# Patient Record
Sex: FEMALE | Race: WHITE | NOT HISPANIC OR LATINO
[De-identification: names, ages, dates, MRNs, and addresses within clinical notes are randomized per-mention and may not be internally consistent; named-entity substitution may affect disease eponyms.]

---

## 2018-02-21 ENCOUNTER — APPOINTMENT (OUTPATIENT)
Dept: DERMATOLOGY | Facility: CLINIC | Age: 53
End: 2018-02-21
Payer: MEDICARE

## 2018-02-21 VITALS — WEIGHT: 225 LBS | SYSTOLIC BLOOD PRESSURE: 148 MMHG | DIASTOLIC BLOOD PRESSURE: 92 MMHG | BODY MASS INDEX: 39.86 KG/M2

## 2018-02-21 DIAGNOSIS — L90.5 SCAR CONDITIONS AND FIBROSIS OF SKIN: ICD-10-CM

## 2018-02-21 PROCEDURE — 99214 OFFICE O/P EST MOD 30 MIN: CPT

## 2018-07-11 ENCOUNTER — APPOINTMENT (OUTPATIENT)
Dept: DERMATOLOGY | Facility: CLINIC | Age: 53
End: 2018-07-11
Payer: MEDICARE

## 2018-07-11 VITALS — DIASTOLIC BLOOD PRESSURE: 86 MMHG | SYSTOLIC BLOOD PRESSURE: 142 MMHG

## 2018-07-11 DIAGNOSIS — L72.0 EPIDERMAL CYST: ICD-10-CM

## 2018-07-11 PROCEDURE — 99214 OFFICE O/P EST MOD 30 MIN: CPT

## 2018-08-22 ENCOUNTER — APPOINTMENT (OUTPATIENT)
Dept: DERMATOLOGY | Facility: CLINIC | Age: 53
End: 2018-08-22

## 2018-09-14 ENCOUNTER — APPOINTMENT (OUTPATIENT)
Dept: DERMATOLOGY | Facility: CLINIC | Age: 53
End: 2018-09-14
Payer: MEDICARE
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
## 2018-09-14 PROCEDURE — 99213 OFFICE O/P EST LOW 20 MIN: CPT

## 2019-01-31 ENCOUNTER — APPOINTMENT (OUTPATIENT)
Dept: DERMATOLOGY | Facility: CLINIC | Age: 54
End: 2019-01-31

## 2019-02-08 ENCOUNTER — APPOINTMENT (OUTPATIENT)
Dept: DERMATOLOGY | Facility: CLINIC | Age: 54
End: 2019-02-08
Payer: MEDICARE

## 2019-02-08 VITALS — SYSTOLIC BLOOD PRESSURE: 130 MMHG | DIASTOLIC BLOOD PRESSURE: 80 MMHG

## 2019-02-08 PROCEDURE — 99214 OFFICE O/P EST MOD 30 MIN: CPT

## 2019-04-18 ENCOUNTER — LABORATORY RESULT (OUTPATIENT)
Age: 54
End: 2019-04-18

## 2019-04-18 ENCOUNTER — APPOINTMENT (OUTPATIENT)
Age: 54
End: 2019-04-18
Payer: MEDICARE

## 2019-04-18 DIAGNOSIS — D48.5 NEOPLASM OF UNCERTAIN BEHAVIOR OF SKIN: ICD-10-CM

## 2019-04-18 PROCEDURE — 99214 OFFICE O/P EST MOD 30 MIN: CPT | Mod: 25,GC

## 2019-04-18 PROCEDURE — 11104 PUNCH BX SKIN SINGLE LESION: CPT | Mod: GC

## 2019-04-18 NOTE — PHYSICAL EXAM
[Alert] : alert [Oriented x 3] : ~L oriented x 3 [Well Nourished] : well nourished [Conjunctiva Non-injected] : conjunctiva non-injected [No Visual Lymphadenopathy] : no visual  lymphadenopathy [No Clubbing] : no clubbing [No Bromhidrosis] : no bromhidrosis [No Edema] : no edema [No Chromhidrosis] : no chromhidrosis [FreeTextEntry3] : 0.7cm pink papule on L upper chest\par \par erythematous patch w/ yellow scale on R medial eyebrow

## 2019-04-18 NOTE — HISTORY OF PRESENT ILLNESS
[FreeTextEntry1] : rash on R eyebrow [de-identified] : 53 year old F w/ PMH of multiple myeloma here for evaluation of:\par \par Rash on R eyebrow x 1 mo. Asymptomatic. Has not tried any tx's.\par \par Pink asymptomatic bump on L upper chest x 3 weeks. No similar lesions elsewhere. \par \par Pt has a history of basal cell carcinoma x 3 on the face (nose,  left cutaneous lip, and chin) - pt reports that they occurred and were treated around 2009. Pt states that she will RTC for her yearly TBSE. \par \par \par \par

## 2019-04-25 ENCOUNTER — TRANSCRIPTION ENCOUNTER (OUTPATIENT)
Age: 54
End: 2019-04-25

## 2019-05-02 ENCOUNTER — APPOINTMENT (OUTPATIENT)
Dept: DERMATOLOGY | Facility: CLINIC | Age: 54
End: 2019-05-02
Payer: MEDICARE

## 2019-05-02 DIAGNOSIS — L72.3 SEBACEOUS CYST: ICD-10-CM

## 2019-05-02 DIAGNOSIS — L08.9 SEBACEOUS CYST: ICD-10-CM

## 2019-05-02 PROCEDURE — 11900 INJECT SKIN LESIONS </W 7: CPT

## 2019-05-02 PROCEDURE — 99213 OFFICE O/P EST LOW 20 MIN: CPT | Mod: 25

## 2020-01-15 ENCOUNTER — APPOINTMENT (OUTPATIENT)
Dept: DERMATOLOGY | Facility: CLINIC | Age: 55
End: 2020-01-15
Payer: MEDICARE

## 2020-01-15 DIAGNOSIS — Z13.89 ENCOUNTER FOR SCREENING FOR OTHER DISORDER: ICD-10-CM

## 2020-01-15 DIAGNOSIS — D17.9 BENIGN LIPOMATOUS NEOPLASM, UNSPECIFIED: ICD-10-CM

## 2020-01-15 DIAGNOSIS — R61 GENERALIZED HYPERHIDROSIS: ICD-10-CM

## 2020-01-15 PROCEDURE — 99213 OFFICE O/P EST LOW 20 MIN: CPT

## 2020-08-13 ENCOUNTER — APPOINTMENT (OUTPATIENT)
Dept: DERMATOLOGY | Facility: CLINIC | Age: 55
End: 2020-08-13

## 2020-09-16 ENCOUNTER — APPOINTMENT (OUTPATIENT)
Dept: DERMATOLOGY | Facility: CLINIC | Age: 55
End: 2020-09-16

## 2020-11-18 ENCOUNTER — APPOINTMENT (OUTPATIENT)
Dept: DERMATOLOGY | Facility: CLINIC | Age: 55
End: 2020-11-18
Payer: MEDICARE

## 2020-11-18 DIAGNOSIS — L70.0 ACNE VULGARIS: ICD-10-CM

## 2020-11-18 PROCEDURE — 99214 OFFICE O/P EST MOD 30 MIN: CPT

## 2020-11-18 RX ORDER — HYDROCORTISONE 25 MG/G
2.5 OINTMENT TOPICAL
Qty: 1 | Refills: 0 | Status: DISCONTINUED | COMMUNITY
Start: 2019-04-18 | End: 2020-11-18

## 2020-11-18 RX ORDER — MUPIROCIN 20 MG/G
2 OINTMENT TOPICAL
Qty: 1 | Refills: 0 | Status: ACTIVE | COMMUNITY
Start: 2020-11-18 | End: 1900-01-01

## 2020-11-18 RX ORDER — KETOCONAZOLE 20 MG/G
2 CREAM TOPICAL
Qty: 1 | Refills: 1 | Status: DISCONTINUED | COMMUNITY
Start: 2019-04-18 | End: 2020-11-18

## 2020-11-18 RX ORDER — HALOBETASOL PROPIONATE 0.5 MG/G
0.05 OINTMENT TOPICAL TWICE DAILY
Qty: 1 | Refills: 2 | Status: DISCONTINUED | COMMUNITY
Start: 2018-09-14 | End: 2020-11-18

## 2020-11-18 RX ORDER — CLINDAMYCIN PHOSPHATE 10 MG/ML
1 LOTION TOPICAL
Qty: 1 | Refills: 5 | Status: ACTIVE | COMMUNITY
Start: 2020-11-18 | End: 1900-01-01

## 2020-11-18 RX ORDER — FUROSEMIDE 20 MG/1
20 TABLET ORAL
Refills: 0 | Status: ACTIVE | COMMUNITY

## 2021-05-25 ENCOUNTER — EMERGENCY (EMERGENCY)
Facility: HOSPITAL | Age: 56
LOS: 1 days | Discharge: HOME | End: 2021-05-25
Attending: STUDENT IN AN ORGANIZED HEALTH CARE EDUCATION/TRAINING PROGRAM | Admitting: STUDENT IN AN ORGANIZED HEALTH CARE EDUCATION/TRAINING PROGRAM
Payer: MEDICARE

## 2021-05-25 VITALS
OXYGEN SATURATION: 98 % | HEART RATE: 94 BPM | DIASTOLIC BLOOD PRESSURE: 82 MMHG | RESPIRATION RATE: 16 BRPM | SYSTOLIC BLOOD PRESSURE: 147 MMHG | TEMPERATURE: 99 F

## 2021-05-25 VITALS
SYSTOLIC BLOOD PRESSURE: 141 MMHG | HEART RATE: 110 BPM | OXYGEN SATURATION: 97 % | RESPIRATION RATE: 16 BRPM | DIASTOLIC BLOOD PRESSURE: 88 MMHG | TEMPERATURE: 99 F

## 2021-05-25 LAB
ALBUMIN SERPL ELPH-MCNC: 3.4 G/DL — SIGNIFICANT CHANGE UP (ref 3.3–5)
ALP SERPL-CCNC: 75 U/L — SIGNIFICANT CHANGE UP (ref 40–120)
ALT FLD-CCNC: 24 U/L — SIGNIFICANT CHANGE UP (ref 12–78)
ANION GAP SERPL CALC-SCNC: 4 MMOL/L — LOW (ref 5–17)
APTT BLD: 26.4 SEC — LOW (ref 27.5–35.5)
AST SERPL-CCNC: 14 U/L — LOW (ref 15–37)
BASOPHILS # BLD AUTO: 0.04 K/UL — SIGNIFICANT CHANGE UP (ref 0–0.2)
BASOPHILS NFR BLD AUTO: 0.4 % — SIGNIFICANT CHANGE UP (ref 0–2)
BILIRUB SERPL-MCNC: 0.5 MG/DL — SIGNIFICANT CHANGE UP (ref 0.2–1.2)
BUN SERPL-MCNC: 11 MG/DL — SIGNIFICANT CHANGE UP (ref 7–23)
CALCIUM SERPL-MCNC: 8.7 MG/DL — SIGNIFICANT CHANGE UP (ref 8.5–10.1)
CHLORIDE SERPL-SCNC: 109 MMOL/L — HIGH (ref 96–108)
CO2 SERPL-SCNC: 29 MMOL/L — SIGNIFICANT CHANGE UP (ref 22–31)
CREAT SERPL-MCNC: 0.77 MG/DL — SIGNIFICANT CHANGE UP (ref 0.5–1.3)
EOSINOPHIL # BLD AUTO: 0.1 K/UL — SIGNIFICANT CHANGE UP (ref 0–0.5)
EOSINOPHIL NFR BLD AUTO: 1 % — SIGNIFICANT CHANGE UP (ref 0–6)
GLUCOSE SERPL-MCNC: 95 MG/DL — SIGNIFICANT CHANGE UP (ref 70–99)
HCT VFR BLD CALC: 41.4 % — SIGNIFICANT CHANGE UP (ref 34.5–45)
HGB BLD-MCNC: 13.9 G/DL — SIGNIFICANT CHANGE UP (ref 11.5–15.5)
IMM GRANULOCYTES NFR BLD AUTO: 0.9 % — SIGNIFICANT CHANGE UP (ref 0–1.5)
INR BLD: 1.02 RATIO — SIGNIFICANT CHANGE UP (ref 0.88–1.16)
LYMPHOCYTES # BLD AUTO: 1.3 K/UL — SIGNIFICANT CHANGE UP (ref 1–3.3)
LYMPHOCYTES # BLD AUTO: 13.1 % — SIGNIFICANT CHANGE UP (ref 13–44)
MCHC RBC-ENTMCNC: 32.1 PG — SIGNIFICANT CHANGE UP (ref 27–34)
MCHC RBC-ENTMCNC: 33.6 GM/DL — SIGNIFICANT CHANGE UP (ref 32–36)
MCV RBC AUTO: 95.6 FL — SIGNIFICANT CHANGE UP (ref 80–100)
MONOCYTES # BLD AUTO: 0.82 K/UL — SIGNIFICANT CHANGE UP (ref 0–0.9)
MONOCYTES NFR BLD AUTO: 8.2 % — SIGNIFICANT CHANGE UP (ref 2–14)
NEUTROPHILS # BLD AUTO: 7.61 K/UL — HIGH (ref 1.8–7.4)
NEUTROPHILS NFR BLD AUTO: 76.4 % — SIGNIFICANT CHANGE UP (ref 43–77)
NRBC # BLD: 0 /100 WBCS — SIGNIFICANT CHANGE UP (ref 0–0)
PLATELET # BLD AUTO: 250 K/UL — SIGNIFICANT CHANGE UP (ref 150–400)
POTASSIUM SERPL-MCNC: 3.9 MMOL/L — SIGNIFICANT CHANGE UP (ref 3.5–5.3)
POTASSIUM SERPL-SCNC: 3.9 MMOL/L — SIGNIFICANT CHANGE UP (ref 3.5–5.3)
PROT SERPL-MCNC: 6.7 G/DL — SIGNIFICANT CHANGE UP (ref 6–8.3)
PROTHROM AB SERPL-ACNC: 11.9 SEC — SIGNIFICANT CHANGE UP (ref 10.6–13.6)
RBC # BLD: 4.33 M/UL — SIGNIFICANT CHANGE UP (ref 3.8–5.2)
RBC # FLD: 13.3 % — SIGNIFICANT CHANGE UP (ref 10.3–14.5)
SODIUM SERPL-SCNC: 142 MMOL/L — SIGNIFICANT CHANGE UP (ref 135–145)
WBC # BLD: 9.96 K/UL — SIGNIFICANT CHANGE UP (ref 3.8–10.5)
WBC # FLD AUTO: 9.96 K/UL — SIGNIFICANT CHANGE UP (ref 3.8–10.5)

## 2021-05-25 PROCEDURE — 72100 X-RAY EXAM L-S SPINE 2/3 VWS: CPT

## 2021-05-25 PROCEDURE — 93971 EXTREMITY STUDY: CPT

## 2021-05-25 PROCEDURE — 72100 X-RAY EXAM L-S SPINE 2/3 VWS: CPT | Mod: 26

## 2021-05-25 PROCEDURE — 80053 COMPREHEN METABOLIC PANEL: CPT

## 2021-05-25 PROCEDURE — 73610 X-RAY EXAM OF ANKLE: CPT | Mod: 26,RT

## 2021-05-25 PROCEDURE — 73610 X-RAY EXAM OF ANKLE: CPT

## 2021-05-25 PROCEDURE — 36415 COLL VENOUS BLD VENIPUNCTURE: CPT

## 2021-05-25 PROCEDURE — 96374 THER/PROPH/DIAG INJ IV PUSH: CPT

## 2021-05-25 PROCEDURE — 73502 X-RAY EXAM HIP UNI 2-3 VIEWS: CPT

## 2021-05-25 PROCEDURE — 73502 X-RAY EXAM HIP UNI 2-3 VIEWS: CPT | Mod: 26,RT

## 2021-05-25 PROCEDURE — 85610 PROTHROMBIN TIME: CPT

## 2021-05-25 PROCEDURE — 85025 COMPLETE CBC W/AUTO DIFF WBC: CPT

## 2021-05-25 PROCEDURE — 99284 EMERGENCY DEPT VISIT MOD MDM: CPT | Mod: 25

## 2021-05-25 PROCEDURE — 85730 THROMBOPLASTIN TIME PARTIAL: CPT

## 2021-05-25 PROCEDURE — 93971 EXTREMITY STUDY: CPT | Mod: 26,RT

## 2021-05-25 PROCEDURE — 99284 EMERGENCY DEPT VISIT MOD MDM: CPT

## 2021-05-25 RX ORDER — ACETAMINOPHEN 500 MG
975 TABLET ORAL ONCE
Refills: 0 | Status: COMPLETED | OUTPATIENT
Start: 2021-05-25 | End: 2021-05-25

## 2021-05-25 RX ORDER — DIAZEPAM 5 MG
5 TABLET ORAL ONCE
Refills: 0 | Status: DISCONTINUED | OUTPATIENT
Start: 2021-05-25 | End: 2021-05-25

## 2021-05-25 RX ORDER — LIDOCAINE 4 G/100G
1 CREAM TOPICAL ONCE
Refills: 0 | Status: COMPLETED | OUTPATIENT
Start: 2021-05-25 | End: 2021-05-25

## 2021-05-25 RX ORDER — BACLOFEN 100 %
1 POWDER (GRAM) MISCELLANEOUS
Qty: 21 | Refills: 0
Start: 2021-05-25 | End: 2021-05-31

## 2021-05-25 RX ORDER — KETOROLAC TROMETHAMINE 30 MG/ML
15 SYRINGE (ML) INJECTION ONCE
Refills: 0 | Status: DISCONTINUED | OUTPATIENT
Start: 2021-05-25 | End: 2021-05-25

## 2021-05-25 RX ADMIN — Medication 975 MILLIGRAM(S): at 13:03

## 2021-05-25 RX ADMIN — Medication 975 MILLIGRAM(S): at 15:16

## 2021-05-25 RX ADMIN — Medication 5 MILLIGRAM(S): at 15:16

## 2021-05-25 RX ADMIN — Medication 15 MILLIGRAM(S): at 15:16

## 2021-05-25 RX ADMIN — LIDOCAINE 1 PATCH: 4 CREAM TOPICAL at 13:03

## 2021-05-25 NOTE — ED PROVIDER NOTE - PROGRESS NOTE DETAILS
patient feels better, still some cramping in right thigh and calf. Per nursing, patient able to ambulate. Will work on DC planning.

## 2021-05-25 NOTE — ED PROVIDER NOTE - OBJECTIVE STATEMENT
56 F history of multiple myeloma on chemotherapy, developmental delay here complaining of right leg pain. Started this morning when she woke up, worse with she puts weight on it. also with bruising by her right ankle and calf pain. No trauma, no new activities. no chest pain, shortness of breath.

## 2021-05-25 NOTE — ED PROVIDER NOTE - NSFOLLOWUPINSTRUCTIONS_ED_ALL_ED_FT
Please follow up with your Primary Care Physician as soon as possible.  Please take you medications as prescribed.  If your symptoms persist or worsen, please seek care. Either return to the Emergency Department or see your primary care doctor.     ====================================================================     Leg Cramps    WHAT YOU NEED TO KNOW:    A leg cramp is a sudden, painful squeeze in your calf (lower leg) or thigh (upper leg) muscles. The muscle may twitch under the skin or feel hard. Your leg may feel sore long after the muscles relax.    DISCHARGE INSTRUCTIONS:    To stretch your leg: Warm up your muscles before you stretch. Take a short walk or run slowly in place. If you get leg cramps while you sleep, you may also want to stretch before bedtime. The following exercises stretch the calves and thighs to help stop or prevent leg cramps:   •To stretch your calf and heel: ?Stand up and place the palms of your hands against a wall.      ?Lean into the wall, with one leg behind the other. Bend the front leg and keep the back leg straight.      ?Press the heel of your back leg into the floor.      ?Hold for 15 to 30 seconds, then switch sides.      •To stretch the back of your knee and thigh: ?Sit on the floor with your legs straight in front of you. Do not point your toes.      ?Place the palms of your hands at your sides on the floor. Slide your hands past your hips toward your ankles.      ?Move toward your ankles until you feel a stretch in the back of your legs. Do not try to touch your head to your knees or round your back.      ?Hold for 30 seconds.        Drink plenty of liquids during exercise: Water and other liquids help prevent cramps by replacing fluids lost in sweat. Drink more liquids when you are active in hot weather.    To stop a leg cramp if it happens again:   •Stretch and massage your muscle to stop the cramp.      •Apply heat or ice packs to the cramp. A heating pad or hot pack may help relieve tight muscles. An ice pack or crushed ice in a bag, wrapped in a towel can soothe tender or sore muscles.      Take your medicine as directed: Call your healthcare provider if you think your medicine is not helping or if you have side effects. Tell him if you are taking any vitamins, herbs, or other medicines. Keep a list of the medicines you take. Include the amounts, and when and why you take them. Bring the list or the pill bottles to follow-up visits.    Follow up with your healthcare provider as directed: Write down any questions you have so you remember to ask them in your follow-up visits.    Contact your healthcare provider if:   •Your leg cramps get worse or happen more often.      •Your leg feels numb.      •Your leg cramps do not go away with stretching or massage.      •You feel dizzy, lightheaded, or confused when you exercise in hot weather. You may have a headache or blurred vision.

## 2021-05-25 NOTE — ED ADULT NURSE NOTE - OBJECTIVE STATEMENT
patient came in ED from ACLD BIBA with c/o right hip/groin area pain X this AM and bruise to right foot lateral side. patient denies recent trauma or fall. alert and oriented x 4 non-labored respiration noted. abdomen nondistended, nontender. patient c/o right hip area pain during exam - palpation and turning and positioning.

## 2021-05-25 NOTE — ED PROVIDER NOTE - PHYSICAL EXAMINATION
Vital signs as available reviewed.  General:  Comfortable, no acute distress.  Head:  Normocephalic, atraumatic.  Eyes:  Conjunctiva pink, no icterus.  Cardiovascular:  Regular rate, no obvious murmur.  Respiratory:  Clear to auscultation, good air entry bilaterally.  Abdomen:  Soft, non-tender.  Musculoskeletal: right ankle ecchymosis. Right hip pain with range of motion. + right calf tenderness.  Neurologic: Alert and oriented, moving all extremities.  Skin:  Warm and dry.

## 2021-05-25 NOTE — ED PROVIDER NOTE - PATIENT PORTAL LINK FT
You can access the FollowMyHealth Patient Portal offered by [facility] by registering at the following website: [email or web address]. By joining [employer]’s FollowMyHealth portal, you will also be able to view your health information using other applications (apps) compatible with our system.

## 2021-05-25 NOTE — ED PROVIDER NOTE - NS ED ROS FT
Constitutional: No fever.  Neurological: No headache.  Eyes: No vision changes.   Ears, Nose, Mouth, Throat: No congestion.  Cardiovascular: No chest pain.  Respiratory: No difficulty breathing.  Gastrointestinal: No nausea or vomiting.  Genitourinary: No dysuria.  Musculoskeletal: + right leg pain.  Integumentary (skin and/or breast): No rash.

## 2021-10-17 ENCOUNTER — EMERGENCY (EMERGENCY)
Facility: HOSPITAL | Age: 56
LOS: 1 days | Discharge: HOME | End: 2021-10-17
Attending: EMERGENCY MEDICINE | Admitting: EMERGENCY MEDICINE
Payer: MEDICARE

## 2021-10-17 VITALS
HEART RATE: 98 BPM | SYSTOLIC BLOOD PRESSURE: 141 MMHG | DIASTOLIC BLOOD PRESSURE: 86 MMHG | TEMPERATURE: 99 F | OXYGEN SATURATION: 97 % | RESPIRATION RATE: 18 BRPM | WEIGHT: 250 LBS | HEIGHT: 63 IN

## 2021-10-17 VITALS
OXYGEN SATURATION: 98 % | RESPIRATION RATE: 16 BRPM | HEART RATE: 97 BPM | DIASTOLIC BLOOD PRESSURE: 85 MMHG | SYSTOLIC BLOOD PRESSURE: 134 MMHG

## 2021-10-17 DIAGNOSIS — Z98.890 OTHER SPECIFIED POSTPROCEDURAL STATES: Chronic | ICD-10-CM

## 2021-10-17 PROBLEM — C90.00 MULTIPLE MYELOMA NOT HAVING ACHIEVED REMISSION: Chronic | Status: ACTIVE | Noted: 2021-05-25

## 2021-10-17 PROCEDURE — 70450 CT HEAD/BRAIN W/O DYE: CPT | Mod: 26,MA

## 2021-10-17 PROCEDURE — 99284 EMERGENCY DEPT VISIT MOD MDM: CPT | Mod: 25

## 2021-10-17 PROCEDURE — 72125 CT NECK SPINE W/O DYE: CPT | Mod: MA

## 2021-10-17 PROCEDURE — 73030 X-RAY EXAM OF SHOULDER: CPT | Mod: 26,LT

## 2021-10-17 PROCEDURE — 99284 EMERGENCY DEPT VISIT MOD MDM: CPT

## 2021-10-17 PROCEDURE — 70450 CT HEAD/BRAIN W/O DYE: CPT | Mod: MA

## 2021-10-17 PROCEDURE — 73030 X-RAY EXAM OF SHOULDER: CPT

## 2021-10-17 PROCEDURE — 72125 CT NECK SPINE W/O DYE: CPT | Mod: 26,MA

## 2021-10-17 NOTE — ED PROVIDER NOTE - IV ALTEPLASE INCLUSION HIDDEN
show

## 2021-10-17 NOTE — ED ADULT NURSE NOTE - OBJECTIVE STATEMENT
pt to er states she rolled off the bed today injuring her left shoulder upon arrival is alert oriented speech clear resp even unlabored able to move arm with little difficulty for ct scan

## 2021-10-17 NOTE — ED PROVIDER NOTE - MUSCULOSKELETAL, MLM
Spine appears normal, range of motion is not limited, left shoulder mild ttp with pain on rom, abduction to over 90 degrees but with pain external rotation 45 deg with pain, no palpable deformity no other bony ttp

## 2021-10-17 NOTE — ED PROVIDER NOTE - CLINICAL SUMMARY MEDICAL DECISION MAKING FREE TEXT BOX
55 yo female mild developmental delay lives in [facility] apartment without aid.  pt sp mechanical fall on  asa co left shoulder pain, no loc, unsure if hit head, check ct head and c spine, shoulder xray. pain meds, reassess 55 yo female mild developmental delay lives in [facility] apartment without aid.  pt sp mechanical fall on  asa co left shoulder pain, no loc, unsure if hit head, check ct head and c spine, shoulder xray. pain meds, reassess--ct and xrays neg, pt ambulatory, sling for comfort, ice, tylenol , fu ortho

## 2021-10-17 NOTE — ED PROVIDER NOTE - CONSTITUTIONAL, MLM
Well appearing, obese,  awake, alert, oriented to person, place, time/situation and in no apparent distress. normal...

## 2021-10-17 NOTE — ED ADULT NURSE NOTE - NSICDXPASTMEDICALHX_GEN_ALL_CORE_FT
PAST MEDICAL HISTORY:  High cholesterol     HTN (hypertension)     Mild developmental delay     Multiple myeloma

## 2021-10-17 NOTE — ED PROVIDER NOTE - PROGRESS NOTE DETAILS
pt with moderate limited rom on active rom, xray with no dislocation,  sling for comfort, tylenol for pain, fu orthpaedics

## 2021-10-17 NOTE — ED PROVIDER NOTE - NSICDXPASTMEDICALHX_GEN_ALL_CORE_FT
PAST MEDICAL HISTORY:  Multiple myeloma      PAST MEDICAL HISTORY:  High cholesterol     HTN (hypertension)     Mild developmental delay     Multiple myeloma

## 2021-10-17 NOTE — ED ADULT NURSE NOTE - NSIMPLEMENTINTERV_GEN_ALL_ED
Implemented All Fall with Harm Risk Interventions:  [place] to call system. Call bell, personal items and telephone within reach. Instruct patient to call for assistance. Room bathroom lighting operational. Non-slip footwear when patient is off stretcher. Physically safe environment: no spills, clutter or unnecessary equipment. Stretcher in lowest position, wheels locked, appropriate side rails in place. Provide visual cue, wrist band, yellow gown, etc. Monitor gait and stability. Monitor for mental status changes and reorient to person, place, and time. Review medications for side effects contributing to fall risk. Reinforce activity limits and safety measures with patient and family. Provide visual clues: red socks.

## 2021-10-17 NOTE — ED PROVIDER NOTE - CARE PLAN
Principal Discharge DX:	Fall at home   1 Principal Discharge DX:	Fall at home  Secondary Diagnosis:	Strain of left shoulder

## 2021-10-17 NOTE — ED PROVIDER NOTE - CARE PROVIDER_API CALL
Juan J Perez  ORTHOPAEDIC SURGERY  [facility], [address] [place] [address]  Phone: [phone]  Fax: [phone]  Follow Up Time: 1-3 Days

## 2021-10-17 NOTE — ED PROVIDER NOTE - PATIENT PORTAL LINK FT
You can access the FollowMyHealth Patient Portal offered by [facility] by registering at the following website: [email or web address]. By joining [employer]’s FollowMyHealth portal, you will also be able to view your health information using other applications (apps) compatible with our system.

## 2021-10-17 NOTE — ED PROVIDER NOTE - NSFOLLOWUPINSTRUCTIONS_ED_ALL_ED_FT
take tylenol as needed for pain,  wear sling for comfort to left shoulder,  apply ice to area 15 minutes per hour for next 48 hours when able,   always walk with cane,  return for severe pain, numbness, weakness, uncontrolled vomiting.  call dr white of orthopaedics for follow up this week.          [facility]                                                                                                                                 Shoulder Sprain       A shoulder sprain is a partial or complete tear in one of the tough, fiber-like tissues (ligaments) in the shoulder. The ligaments in the shoulder help to hold the shoulder in place.      What are the causes?  This condition may be caused by:  •A fall.      •A hit to the shoulder.      •A twist of the arm.        What increases the risk?  You are more likely to develop this condition if you:  •Play sports.      •Have problems with balance or coordination.        What are the signs or symptoms?  Symptoms of this condition include:  •Pain when moving the shoulder.      •Limited ability to move the shoulder.      •Swelling and tenderness on top of the shoulder.      •Warmth in the shoulder.      •A change in the shape of the shoulder.      •Redness or bruising on the shoulder.        How is this diagnosed?  This condition is diagnosed with:  •A physical exam. During the exam, you may be asked to do simple exercises with your shoulder.      •Imaging tests such as X-rays, MRI, or a CT scan. These tests can show how severe the sprain is.        How is this treated?  This condition may be treated with:  •Rest.      •Pain medicine.      •Ice.      •A sling or brace. This is used to keep the arm still while the shoulder is healing.      •Physical therapy or rehabilitation exercises. These help to improve the range of motion and strength of the shoulder.      •Surgery (rare). Surgery may be needed if the sprain caused a joint to become unstable. Surgery may also be needed to reduce pain.      Some people may develop ongoing shoulder pain or lose some range of motion in the shoulder. However, most people do not develop long-term problems.      Follow these instructions at home:     If you have a sling or brace:     •Wear the sling or brace as told by your health care provider. Remove it only as told by your health care provider.      •Loosen the sling or brace if your fingers tingle, become numb, or turn cold and blue.      •Keep the sling or brace clean.    •If the sling or brace is not waterproof:  •Do not let it get wet.      •Cover it with a watertight covering when you take a bath or shower.        Activity     •Rest your shoulder.      •Move your arm only as much as told by your health care provider, but move your hand and fingers often to prevent stiffness and swelling.      •Return to your normal activities as told by your health care provider. Ask your health care provider what activities are safe for you.      •Ask your health care provider when it is safe for you to drive if you have a sling or brace on your shoulder.      •If you were shown how to do any exercises, do them as told by your health care provider.      General instructions   •If directed, put ice on the affected area.  •Put ice in a plastic bag.      •Place a towel between your skin and the bag.      •Leave the ice on for 20 minutes, 2–3 times a day.        •Take over-the-counter and prescription medicines only as told by your health care provider.      • Do not use any products that contain nicotine or tobacco, such as cigarettes, e-cigarettes, and chewing tobacco. These can delay healing. If you need help quitting, ask your health care provider.      •Keep all follow-up visits as told by your health care provider. This is important.        Contact a health care provider if:    •Your pain gets worse.      •Your pain is not relieved with medicines.      •You have increased redness or swelling.        Get help right away if:    •You have a fever.      •You cannot move your arm or shoulder.      •You develop severe numbness or tingling in your arm, hand, or fingers.      •Your arm, hand, or fingers feel cold and turn blue, white, or gray.        Summary    •A shoulder sprain is a partial or complete tear in one of the tough, fiber-like tissues (ligaments) in the shoulder.      •This condition may be caused by a fall, a hit to the shoulder, or a twist of the arm.      •Treatment usually includes rest, ice, and pain medicine as needed.      •If you have a sling or brace, wear it as told by your health care provider. Remove it only as told by your health care provider.      This information is not intended to replace advice given to you by your health care provider. Make sure you discuss any questions you have with your health care provider.      Document Revised: 05/24/2019 Document Reviewed: 05/24/2019    Elsevier Patient Education © 2021 [employer]

## 2021-12-08 PROBLEM — R62.50 UNSPECIFIED LACK OF EXPECTED NORMAL PHYSIOLOGICAL DEVELOPMENT IN CHILDHOOD: Chronic | Status: ACTIVE | Noted: 2021-10-17

## 2021-12-08 PROBLEM — I10 ESSENTIAL (PRIMARY) HYPERTENSION: Chronic | Status: ACTIVE | Noted: 2021-10-17

## 2021-12-08 PROBLEM — E78.00 PURE HYPERCHOLESTEROLEMIA, UNSPECIFIED: Chronic | Status: ACTIVE | Noted: 2021-10-17

## 2022-03-10 ENCOUNTER — APPOINTMENT (OUTPATIENT)
Dept: DERMATOLOGY | Facility: CLINIC | Age: 57
End: 2022-03-10
Payer: MEDICARE

## 2022-03-10 DIAGNOSIS — L71.9 ROSACEA, UNSPECIFIED: ICD-10-CM

## 2022-03-10 DIAGNOSIS — Z85.828 PERSONAL HISTORY OF OTHER MALIGNANT NEOPLASM OF SKIN: ICD-10-CM

## 2022-03-10 DIAGNOSIS — L82.1 OTHER SEBORRHEIC KERATOSIS: ICD-10-CM

## 2022-03-10 DIAGNOSIS — Z12.83 ENCOUNTER FOR SCREENING FOR MALIGNANT NEOPLASM OF SKIN: ICD-10-CM

## 2022-03-10 PROCEDURE — 99214 OFFICE O/P EST MOD 30 MIN: CPT

## 2022-03-10 RX ORDER — AZELAIC ACID 0.15 G/G
15 GEL TOPICAL
Qty: 1 | Refills: 11 | Status: ACTIVE | COMMUNITY
Start: 2022-03-10 | End: 1900-01-01

## 2022-03-10 RX ORDER — SODIUM SULFACETAMIDE 100 MG/G
10 LIQUID TOPICAL
Qty: 1 | Refills: 11 | Status: ACTIVE | COMMUNITY
Start: 2022-03-10 | End: 1900-01-01

## 2022-05-25 ENCOUNTER — EMERGENCY (EMERGENCY)
Facility: HOSPITAL | Age: 57
LOS: 1 days | Discharge: HOME | End: 2022-05-25
Attending: EMERGENCY MEDICINE | Admitting: EMERGENCY MEDICINE
Payer: MEDICARE

## 2022-05-25 VITALS
TEMPERATURE: 98 F | DIASTOLIC BLOOD PRESSURE: 85 MMHG | HEART RATE: 102 BPM | OXYGEN SATURATION: 97 % | HEIGHT: 63 IN | RESPIRATION RATE: 16 BRPM | WEIGHT: 279.99 LBS | SYSTOLIC BLOOD PRESSURE: 153 MMHG

## 2022-05-25 VITALS
SYSTOLIC BLOOD PRESSURE: 141 MMHG | HEART RATE: 86 BPM | TEMPERATURE: 98 F | DIASTOLIC BLOOD PRESSURE: 82 MMHG | RESPIRATION RATE: 16 BRPM | OXYGEN SATURATION: 96 %

## 2022-05-25 DIAGNOSIS — Z98.890 OTHER SPECIFIED POSTPROCEDURAL STATES: Chronic | ICD-10-CM

## 2022-05-25 PROCEDURE — 96372 THER/PROPH/DIAG INJ SC/IM: CPT

## 2022-05-25 PROCEDURE — 72100 X-RAY EXAM L-S SPINE 2/3 VWS: CPT | Mod: 26

## 2022-05-25 PROCEDURE — 99284 EMERGENCY DEPT VISIT MOD MDM: CPT | Mod: FS

## 2022-05-25 PROCEDURE — 72100 X-RAY EXAM L-S SPINE 2/3 VWS: CPT

## 2022-05-25 PROCEDURE — 99283 EMERGENCY DEPT VISIT LOW MDM: CPT | Mod: 25

## 2022-05-25 RX ORDER — CYCLOBENZAPRINE HYDROCHLORIDE 10 MG/1
1 TABLET, FILM COATED ORAL
Qty: 10 | Refills: 0
Start: 2022-05-25 | End: 2022-05-29

## 2022-05-25 RX ORDER — OXYCODONE AND ACETAMINOPHEN 5; 325 MG/1; MG/1
1 TABLET ORAL ONCE
Refills: 0 | Status: DISCONTINUED | OUTPATIENT
Start: 2022-05-25 | End: 2022-05-25

## 2022-05-25 RX ORDER — KETOROLAC TROMETHAMINE 30 MG/ML
60 SYRINGE (ML) INJECTION ONCE
Refills: 0 | Status: DISCONTINUED | OUTPATIENT
Start: 2022-05-25 | End: 2022-05-25

## 2022-05-25 RX ADMIN — OXYCODONE AND ACETAMINOPHEN 1 TABLET(S): 5; 325 TABLET ORAL at 21:55

## 2022-05-25 RX ADMIN — OXYCODONE AND ACETAMINOPHEN 1 TABLET(S): 5; 325 TABLET ORAL at 20:54

## 2022-05-25 RX ADMIN — Medication 60 MILLIGRAM(S): at 22:24

## 2022-05-25 RX ADMIN — Medication 60 MILLIGRAM(S): at 21:09

## 2022-05-25 NOTE — ED PROVIDER NOTE - PROGRESS NOTE DETAILS
pt with improved pain. ambulated in ED with walker. All questions were answered. Discussed the importance of prompt, close medical follow-up. Patient will return with any changes, concerns or persistent/worsening symptoms.  Patient verbalized understanding.

## 2022-05-25 NOTE — ED ADULT NURSE NOTE - OBJECTIVE STATEMENT
Stated she has had back pain since 4 years old. was managed by pain management Stated she has had back pain since 4 years old. pain worsened today, denied falling. Was managed by pain management doctor, but did not call him today. Pain described as sharp non radiating, 7/10. No numbness or tingling, no nausea or vomiting. Stated she use a Rollator to assist with ambulation. Lives alone, developmental delay and is pending placement for ACLD due to self care deficit> forgetful, and sometimes does not remember taking her meds.

## 2022-05-25 NOTE — ED PROVIDER NOTE - NS ED ATTENDING STATEMENT MOD
This was a shared visit with the DOC. I reviewed and verified the documentation and independently performed the documented:

## 2022-05-25 NOTE — ED ADULT TRIAGE NOTE - NS ED NURSE BANDS TYPE
Patient here for suture removal.  Healing appropriately.    Vital signs reviewed, WNL    Exam:  5 Sutures in place in the Left wrist  No surrounding erythema  No purulent discharge    Plan:  Suture removal:  Removed without difficulty  No further cares needed    Lee Quiroz DO   Name band;

## 2022-05-25 NOTE — ED PROVIDER NOTE - PHYSICAL EXAMINATION
Constitutional: Awake, Alert, non-toxic  HEAD: Normocephalic, atraumatic.   EYES: EOM intact, conjunctiva and sclera are clear bilaterally.   NECK: Supple, non-tender  BACK:  (+) lumbar midline TTP and paraspinal muscle TTP. . No midline or paraspinal TTP of cervical/thoracic spine, FROM. No ecchymosis or hematomas.   CARDIOVASCULAR: Normal S1, S2; regular rate and rhythm.  RESPIRATORY: Normal respiratory effort; breath sounds CTAB, no wheezes, rhonchi, or rales. Speaking in full sentences. No accessory muscle use.   ABDOMEN: Soft; non-tender, non-distended. no CVA TTP.   EXTREMITIES: Full passive and active ROM in all extremities; non-tender to palpation; distal pulses palpable and symmetric, no edema, no crepitus or step off  SKIN: Warm, dry; good skin turgor, no apparent lesions or rashes, no ecchymosis, brisk capillary refill.  NEURO: A&O x3. Sensory and motor functions are grossly intact. Speech is normal. Appearance and judgement seem appropriate for gender and age. No neurological deficits. Neurovascular sensation intact motor function 5/5 of upper and lower extremities

## 2022-05-25 NOTE — ED PROVIDER NOTE - CLINICAL SUMMARY MEDICAL DECISION MAKING FREE TEXT BOX
56 yo female hx of MM, DD, HTN c/o atraumatic acute on chronic back pain after lifting, aching, nonradiating, worse with movement.  no n/v/d, no abdominal pain.  no urinary/bowel changes.  no fever/chills.  pt ambulating with walker, no midline C/T/L spine ttp, +b/l paraspinal ttp, neuro/motor grossly intact, Xray negative for acute fx, analgesia, reassess, pt has pain management doctor to f/u with.

## 2022-05-25 NOTE — ED ADULT NURSE NOTE - CHPI ED NUR SYMPTOMS NEG
Incontinent of bladder/no anorexia/no constipation/no fatigue/no motor function loss/no neck tenderness/no numbness/no tingling

## 2022-05-25 NOTE — ED PROVIDER NOTE - CARE PROVIDER_API CALL
Shlomo Chery (DO)  Orthopaedic Surgery  [address] [place] [address]  Phone: [phone]  Fax: [phone]  Follow Up Time: 1-3 Days

## 2022-05-25 NOTE — ED PROVIDER NOTE - PATIENT PORTAL LINK FT
You can access the FollowMyHealth Patient Portal offered by [facility] by registering at the following website: [email or web address]. By joining [employer]’s FollowMyHealth portal, you will also be able to view your health information using other applications (apps) compatible with our system.

## 2022-05-25 NOTE — ED PROVIDER NOTE - NSFOLLOWUPINSTRUCTIONS_ED_ALL_ED_FT
1) Follow-up with Orthopedics, See referred doctor. Call today/next business day for close, prompt follow-up.  2) Return to Emergency room for any worsening or persistent pain, weakness, numbness, fever, color change to extremity, or any other concerning symptoms.  3) Consider NSAID and Tylenol for pain. Be cautious when taking Flexeril as it may cause drowsiness. Do not drive or operate machinery when taking Flexeril.   4) You can consider discussing with your doctor if physical therapy or further imaging as an MRI may be beneficial.     Acute Back Pain, Adult      Acute back pain is sudden and usually short-lived. It is often caused by an injury to the muscles and tissues in the back. The injury may result from:  •A muscle, tendon, or ligament getting overstretched or torn. Ligaments are tissues that connect bones to each other. Lifting something improperly can cause a back strain.      •Wear and tear (degeneration) of the spinal disks. Spinal disks are circular tissue that provide cushioning between the bones of the spine (vertebrae).      •Twisting motions, such as while playing sports or doing yard work.      •A hit to the back.      •Arthritis.      You may have a physical exam, lab tests, and imaging tests to find the cause of your pain. Acute back pain usually goes away with rest and home care.      Follow these instructions at home:    Managing pain, stiffness, and swelling     •Take over-the-counter and prescription medicines only as told by your health care provider. Treatment may include medicines for pain and inflammation that are taken by mouth or applied to the skin, or muscle relaxants.    •Your health care provider may recommend applying ice during the first 24–48 hours after your pain starts. To do this:  •Put ice in a plastic bag.      •Place a towel between your skin and the bag.      •Leave the ice on for 20 minutes, 2–3 times a day.      •Remove the ice if your skin turns bright red. This is very important. If you cannot feel pain, heat, or cold, you have a greater risk of damage to the area.      •If directed, apply heat to the affected area as often as told by your health care provider. Use the heat source that your health care provider recommends, such as a moist heat pack or a heating pad.  •Place a towel between your skin and the heat source.      •Leave the heat on for 20–30 minutes.      •Remove the heat if your skin turns bright red. This is especially important if you are unable to feel pain, heat, or cold. You have a greater risk of getting burned.          Activity   Comparisons of good and bad posture while driving, standing, sitting at a desk, and lifting heavy objects.   • Do not stay in bed. Staying in bed for more than 1–2 days can delay your recovery.    •Sit up and stand up straight. Avoid leaning forward when you sit or hunching over when you stand.  •If you work at a desk, sit close to it so you do not need to lean over. Keep your chin tucked in. Keep your neck drawn back, and keep your elbows bent at a 90-degree angle (right angle).      •Sit high and close to the steering wheel when you drive. Add lower back (lumbar) support to your car seat, if needed.        •Take short walks on even surfaces as soon as you are able. Try to increase the length of time you walk each day.      • Do not sit, drive, or  one place for more than 30 minutes at a time. Sitting or standing for long periods of time can put stress on your back.      • Do not drive or use heavy machinery while taking prescription pain medicine.    •Use proper lifting techniques. When you bend and lift, use positions that put less stress on your back:  •Bend your knees.      •Keep the load close to your body.      •Avoid twisting.        •Exercise regularly as told by your health care provider. Exercising helps your back heal faster and helps prevent back injuries by keeping muscles strong and flexible.      •Work with a physical therapist to make a safe exercise program, as recommended by your health care provider. Do any exercises as told by your physical therapist.      Lifestyle     •Maintain a healthy weight. Extra weight puts stress on your back and makes it difficult to have good posture.      •Avoid activities or situations that make you feel anxious or stressed. Stress and anxiety increase muscle tension and can make back pain worse. Learn ways to manage anxiety and stress, such as through exercise.      General instructions     •Sleep on a firm mattress in a comfortable position. Try lying on your side with your knees slightly bent. If you lie on your back, put a pillow under your knees.    •Keep your head and neck in a straight line with your spine (neutral position) when using electronic equipment like smartphones or pads. To do this:  •Raise your smartphone or pad to look at it instead of bending your head or neck to look down.      •Put the smartphone or pad at the level of your face while looking at the screen.      •Follow your treatment plan as told by your health care provider. This may include:  •Cognitive or behavioral therapy.      •Acupuncture or massage therapy.      •Meditation or yoga.          Contact a health care provider if:    •You have pain that is not relieved with rest or medicine.      •You have increasing pain going down into your legs or buttocks.      •Your pain does not improve after 2 weeks.      •You have pain at night.      •You lose weight without trying.      •You have a fever or chills.      •You develop nausea or vomiting.      •You develop abdominal pain.        Get help right away if:    •You develop new bowel or bladder control problems.      •You have unusual weakness or numbness in your arms or legs.      •You feel faint.      These symptoms may represent a serious problem that is an emergency. Do not wait to see if the symptoms will go away. Get medical help right away. Call your local emergency services (911 in the [place] Do not drive yourself to the hospital.       Summary    •Acute back pain is sudden and usually short-lived.      •Use proper lifting techniques. When you bend and lift, use positions that put less stress on your back.      •Take over-the-counter and prescription medicines only as told by your health care provider, and apply heat or ice as told.      This information is not intended to replace advice given to you by your health care provider. Make sure you discuss any questions you have with your health care provider.

## 2022-06-06 ENCOUNTER — INPATIENT (INPATIENT)
Facility: HOSPITAL | Age: 57
LOS: 5 days | Discharge: HOME | DRG: 603 | End: 2022-06-12
Attending: HOSPITALIST | Admitting: INTERNAL MEDICINE
Payer: MEDICARE

## 2022-06-06 VITALS
HEART RATE: 110 BPM | TEMPERATURE: 98 F | SYSTOLIC BLOOD PRESSURE: 147 MMHG | OXYGEN SATURATION: 97 % | DIASTOLIC BLOOD PRESSURE: 85 MMHG | RESPIRATION RATE: 16 BRPM | WEIGHT: 259.93 LBS | HEIGHT: 63 IN

## 2022-06-06 DIAGNOSIS — R73.03 PREDIABETES: ICD-10-CM

## 2022-06-06 DIAGNOSIS — R60.0 LOCALIZED EDEMA: ICD-10-CM

## 2022-06-06 DIAGNOSIS — Z98.890 OTHER SPECIFIED POSTPROCEDURAL STATES: Chronic | ICD-10-CM

## 2022-06-06 DIAGNOSIS — F41.9 ANXIETY DISORDER, UNSPECIFIED: ICD-10-CM

## 2022-06-06 DIAGNOSIS — E78.5 HYPERLIPIDEMIA, UNSPECIFIED: ICD-10-CM

## 2022-06-06 DIAGNOSIS — M54.9 DORSALGIA, UNSPECIFIED: ICD-10-CM

## 2022-06-06 DIAGNOSIS — M79.89 OTHER SPECIFIED SOFT TISSUE DISORDERS: ICD-10-CM

## 2022-06-06 DIAGNOSIS — I10 ESSENTIAL (PRIMARY) HYPERTENSION: ICD-10-CM

## 2022-06-06 DIAGNOSIS — C90.00 MULTIPLE MYELOMA NOT HAVING ACHIEVED REMISSION: ICD-10-CM

## 2022-06-06 DIAGNOSIS — L03.90 CELLULITIS, UNSPECIFIED: ICD-10-CM

## 2022-06-06 DIAGNOSIS — Z29.9 ENCOUNTER FOR PROPHYLACTIC MEASURES, UNSPECIFIED: ICD-10-CM

## 2022-06-06 DIAGNOSIS — R06.02 SHORTNESS OF BREATH: ICD-10-CM

## 2022-06-06 LAB
ALBUMIN SERPL ELPH-MCNC: 3 G/DL — LOW (ref 3.3–5)
ALP SERPL-CCNC: 92 U/L — SIGNIFICANT CHANGE UP (ref 40–120)
ALT FLD-CCNC: 60 U/L — SIGNIFICANT CHANGE UP (ref 12–78)
ANION GAP SERPL CALC-SCNC: 5 MMOL/L — SIGNIFICANT CHANGE UP (ref 5–17)
AST SERPL-CCNC: 39 U/L — HIGH (ref 15–37)
BASOPHILS # BLD AUTO: 0.03 K/UL — SIGNIFICANT CHANGE UP (ref 0–0.2)
BASOPHILS NFR BLD AUTO: 0.3 % — SIGNIFICANT CHANGE UP (ref 0–2)
BILIRUB SERPL-MCNC: 0.3 MG/DL — SIGNIFICANT CHANGE UP (ref 0.2–1.2)
BUN SERPL-MCNC: 15 MG/DL — SIGNIFICANT CHANGE UP (ref 7–23)
CALCIUM SERPL-MCNC: 9 MG/DL — SIGNIFICANT CHANGE UP (ref 8.5–10.1)
CHLORIDE SERPL-SCNC: 104 MMOL/L — SIGNIFICANT CHANGE UP (ref 96–108)
CO2 SERPL-SCNC: 29 MMOL/L — SIGNIFICANT CHANGE UP (ref 22–31)
CREAT SERPL-MCNC: 0.88 MG/DL — SIGNIFICANT CHANGE UP (ref 0.5–1.3)
D DIMER BLD IA.RAPID-MCNC: 723 NG/ML DDU — HIGH
EGFR: 77 ML/MIN/1.73M2 — SIGNIFICANT CHANGE UP
EOSINOPHIL # BLD AUTO: 0.05 K/UL — SIGNIFICANT CHANGE UP (ref 0–0.5)
EOSINOPHIL NFR BLD AUTO: 0.5 % — SIGNIFICANT CHANGE UP (ref 0–6)
GLUCOSE SERPL-MCNC: 98 MG/DL — SIGNIFICANT CHANGE UP (ref 70–99)
HCT VFR BLD CALC: 35.9 % — SIGNIFICANT CHANGE UP (ref 34.5–45)
HGB BLD-MCNC: 11.8 G/DL — SIGNIFICANT CHANGE UP (ref 11.5–15.5)
IMM GRANULOCYTES NFR BLD AUTO: 2 % — HIGH (ref 0–1.5)
LYMPHOCYTES # BLD AUTO: 0.49 K/UL — LOW (ref 1–3.3)
LYMPHOCYTES # BLD AUTO: 4.8 % — LOW (ref 13–44)
MANUAL SMEAR VERIFICATION: SIGNIFICANT CHANGE UP
MCHC RBC-ENTMCNC: 31.9 PG — SIGNIFICANT CHANGE UP (ref 27–34)
MCHC RBC-ENTMCNC: 32.9 GM/DL — SIGNIFICANT CHANGE UP (ref 32–36)
MCV RBC AUTO: 97 FL — SIGNIFICANT CHANGE UP (ref 80–100)
MONOCYTES # BLD AUTO: 0.94 K/UL — HIGH (ref 0–0.9)
MONOCYTES NFR BLD AUTO: 9.3 % — SIGNIFICANT CHANGE UP (ref 2–14)
NEUTROPHILS # BLD AUTO: 8.44 K/UL — HIGH (ref 1.8–7.4)
NEUTROPHILS NFR BLD AUTO: 83.1 % — HIGH (ref 43–77)
NRBC # BLD: 0 /100 WBCS — SIGNIFICANT CHANGE UP (ref 0–0)
NT-PROBNP SERPL-SCNC: 20 PG/ML — SIGNIFICANT CHANGE UP (ref 0–125)
PLAT MORPH BLD: NORMAL — SIGNIFICANT CHANGE UP
PLATELET # BLD AUTO: 296 K/UL — SIGNIFICANT CHANGE UP (ref 150–400)
POTASSIUM SERPL-MCNC: 3.5 MMOL/L — SIGNIFICANT CHANGE UP (ref 3.5–5.3)
POTASSIUM SERPL-SCNC: 3.5 MMOL/L — SIGNIFICANT CHANGE UP (ref 3.5–5.3)
PROT SERPL-MCNC: 5.9 G/DL — LOW (ref 6–8.3)
RBC # BLD: 3.7 M/UL — LOW (ref 3.8–5.2)
RBC # FLD: 13.1 % — SIGNIFICANT CHANGE UP (ref 10.3–14.5)
RBC BLD AUTO: SIGNIFICANT CHANGE UP
SARS-COV-2 RNA SPEC QL NAA+PROBE: SIGNIFICANT CHANGE UP
SODIUM SERPL-SCNC: 138 MMOL/L — SIGNIFICANT CHANGE UP (ref 135–145)
TROPONIN I, HIGH SENSITIVITY RESULT: 3.9 NG/L — SIGNIFICANT CHANGE UP
WBC # BLD: 10.15 K/UL — SIGNIFICANT CHANGE UP (ref 3.8–10.5)
WBC # FLD AUTO: 10.15 K/UL — SIGNIFICANT CHANGE UP (ref 3.8–10.5)

## 2022-06-06 PROCEDURE — 93306 TTE W/DOPPLER COMPLETE: CPT | Mod: 26

## 2022-06-06 PROCEDURE — 71045 X-RAY EXAM CHEST 1 VIEW: CPT | Mod: 26

## 2022-06-06 PROCEDURE — 99223 1ST HOSP IP/OBS HIGH 75: CPT

## 2022-06-06 PROCEDURE — 93970 EXTREMITY STUDY: CPT | Mod: 26

## 2022-06-06 PROCEDURE — 71275 CT ANGIOGRAPHY CHEST: CPT | Mod: 26,ME

## 2022-06-06 PROCEDURE — 99285 EMERGENCY DEPT VISIT HI MDM: CPT

## 2022-06-06 PROCEDURE — G1004: CPT

## 2022-06-06 PROCEDURE — 99223 1ST HOSP IP/OBS HIGH 75: CPT | Mod: GC

## 2022-06-06 RX ORDER — ACETAMINOPHEN 500 MG
975 TABLET ORAL EVERY 8 HOURS
Refills: 0 | Status: DISCONTINUED | OUTPATIENT
Start: 2022-06-06 | End: 2022-06-12

## 2022-06-06 RX ORDER — PIPERACILLIN AND TAZOBACTAM 4; .5 G/20ML; G/20ML
3.38 INJECTION, POWDER, LYOPHILIZED, FOR SOLUTION INTRAVENOUS EVERY 8 HOURS
Refills: 0 | Status: DISCONTINUED | OUTPATIENT
Start: 2022-06-06 | End: 2022-06-07

## 2022-06-06 RX ORDER — POTASSIUM CHLORIDE 20 MEQ
40 PACKET (EA) ORAL ONCE
Refills: 0 | Status: COMPLETED | OUTPATIENT
Start: 2022-06-06 | End: 2022-06-06

## 2022-06-06 RX ORDER — ACETAMINOPHEN 500 MG
650 TABLET ORAL EVERY 6 HOURS
Refills: 0 | Status: DISCONTINUED | OUTPATIENT
Start: 2022-06-06 | End: 2022-06-06

## 2022-06-06 RX ORDER — FUROSEMIDE 40 MG
1 TABLET ORAL
Qty: 0 | Refills: 0 | DISCHARGE

## 2022-06-06 RX ORDER — NORTRIPTYLINE HYDROCHLORIDE 10 MG/1
1 CAPSULE ORAL
Qty: 0 | Refills: 0 | DISCHARGE

## 2022-06-06 RX ORDER — PIPERACILLIN AND TAZOBACTAM 4; .5 G/20ML; G/20ML
3.38 INJECTION, POWDER, LYOPHILIZED, FOR SOLUTION INTRAVENOUS ONCE
Refills: 0 | Status: COMPLETED | OUTPATIENT
Start: 2022-06-06 | End: 2022-06-06

## 2022-06-06 RX ORDER — BUPROPION HYDROCHLORIDE 150 MG/1
1 TABLET, EXTENDED RELEASE ORAL
Qty: 0 | Refills: 0 | DISCHARGE

## 2022-06-06 RX ORDER — SENNA PLUS 8.6 MG/1
2 TABLET ORAL AT BEDTIME
Refills: 0 | Status: DISCONTINUED | OUTPATIENT
Start: 2022-06-06 | End: 2022-06-12

## 2022-06-06 RX ORDER — FUROSEMIDE 40 MG
40 TABLET ORAL DAILY
Refills: 0 | Status: DISCONTINUED | OUTPATIENT
Start: 2022-06-07 | End: 2022-06-08

## 2022-06-06 RX ORDER — FUROSEMIDE 40 MG
40 TABLET ORAL ONCE
Refills: 0 | Status: COMPLETED | OUTPATIENT
Start: 2022-06-06 | End: 2022-06-06

## 2022-06-06 RX ORDER — ENOXAPARIN SODIUM 100 MG/ML
40 INJECTION SUBCUTANEOUS EVERY 12 HOURS
Refills: 0 | Status: DISCONTINUED | OUTPATIENT
Start: 2022-06-06 | End: 2022-06-12

## 2022-06-06 RX ORDER — BUPROPION HYDROCHLORIDE 150 MG/1
150 TABLET, EXTENDED RELEASE ORAL DAILY
Refills: 0 | Status: DISCONTINUED | OUTPATIENT
Start: 2022-06-06 | End: 2022-06-12

## 2022-06-06 RX ORDER — GABAPENTIN 400 MG/1
1 CAPSULE ORAL
Qty: 0 | Refills: 0 | DISCHARGE

## 2022-06-06 RX ORDER — ACYCLOVIR SODIUM 500 MG
1 VIAL (EA) INTRAVENOUS
Qty: 0 | Refills: 0 | DISCHARGE

## 2022-06-06 RX ORDER — GABAPENTIN 400 MG/1
300 CAPSULE ORAL AT BEDTIME
Refills: 0 | Status: DISCONTINUED | OUTPATIENT
Start: 2022-06-06 | End: 2022-06-12

## 2022-06-06 RX ORDER — MONTELUKAST 4 MG/1
1 TABLET, CHEWABLE ORAL
Qty: 0 | Refills: 0 | DISCHARGE

## 2022-06-06 RX ORDER — ATORVASTATIN CALCIUM 80 MG/1
1 TABLET, FILM COATED ORAL
Qty: 0 | Refills: 0 | DISCHARGE

## 2022-06-06 RX ORDER — MONTELUKAST 4 MG/1
10 TABLET, CHEWABLE ORAL AT BEDTIME
Refills: 0 | Status: DISCONTINUED | OUTPATIENT
Start: 2022-06-06 | End: 2022-06-12

## 2022-06-06 RX ORDER — POTASSIUM CHLORIDE 20 MEQ
1.5 PACKET (EA) ORAL
Qty: 0 | Refills: 0 | DISCHARGE

## 2022-06-06 RX ORDER — GABAPENTIN 400 MG/1
300 CAPSULE ORAL DAILY
Refills: 0 | Status: DISCONTINUED | OUTPATIENT
Start: 2022-06-06 | End: 2022-06-06

## 2022-06-06 RX ORDER — ATORVASTATIN CALCIUM 80 MG/1
20 TABLET, FILM COATED ORAL AT BEDTIME
Refills: 0 | Status: DISCONTINUED | OUTPATIENT
Start: 2022-06-06 | End: 2022-06-12

## 2022-06-06 RX ORDER — POLYETHYLENE GLYCOL 3350 17 G/17G
17 POWDER, FOR SOLUTION ORAL DAILY
Refills: 0 | Status: DISCONTINUED | OUTPATIENT
Start: 2022-06-06 | End: 2022-06-12

## 2022-06-06 RX ADMIN — ATORVASTATIN CALCIUM 20 MILLIGRAM(S): 80 TABLET, FILM COATED ORAL at 23:34

## 2022-06-06 RX ADMIN — MONTELUKAST 10 MILLIGRAM(S): 4 TABLET, CHEWABLE ORAL at 23:34

## 2022-06-06 RX ADMIN — SENNA PLUS 2 TABLET(S): 8.6 TABLET ORAL at 23:33

## 2022-06-06 RX ADMIN — Medication 40 MILLIGRAM(S): at 13:40

## 2022-06-06 RX ADMIN — PIPERACILLIN AND TAZOBACTAM 200 GRAM(S): 4; .5 INJECTION, POWDER, LYOPHILIZED, FOR SOLUTION INTRAVENOUS at 16:37

## 2022-06-06 RX ADMIN — GABAPENTIN 300 MILLIGRAM(S): 400 CAPSULE ORAL at 23:33

## 2022-06-06 RX ADMIN — PIPERACILLIN AND TAZOBACTAM 25 GRAM(S): 4; .5 INJECTION, POWDER, LYOPHILIZED, FOR SOLUTION INTRAVENOUS at 23:00

## 2022-06-06 RX ADMIN — BUPROPION HYDROCHLORIDE 150 MILLIGRAM(S): 150 TABLET, EXTENDED RELEASE ORAL at 19:40

## 2022-06-06 RX ADMIN — ENOXAPARIN SODIUM 40 MILLIGRAM(S): 100 INJECTION SUBCUTANEOUS at 19:40

## 2022-06-06 RX ADMIN — Medication 40 MILLIEQUIVALENT(S): at 19:40

## 2022-06-06 NOTE — H&P ADULT - NSHPPHYSICALEXAM_GEN_ALL_CORE
T(C): 36.8 (06-06-22 @ 13:42), Max: 36.8 (06-06-22 @ 11:57)  HR: 93 (06-06-22 @ 16:39) (90 - 110)  BP: 149/79 (06-06-22 @ 16:39) (113/63 - 149/79)  RR: 18 (06-06-22 @ 16:39) (16 - 18)  SpO2: 100% (06-06-22 @ 16:39) (94% - 100%)    GENERAL: patient appears well, no acute distress, appropriate, pleasant  EYES: sclera clear, no exudates  ENMT: oropharynx clear without erythema, no exudates, dry mucous membranes  NECK: supple, soft, no thyromegaly noted  LUNGS: good air entry bilaterally, clear to auscultation, symmetric breath sounds, no wheezing or rhonchi appreciated  HEART: soft S1/S2, regular rate and rhythm, no murmurs noted, 2+ b/l LE pitting edema   GASTROINTESTINAL: abdomen is soft, nontender, nondistended, normoactive bowel sounds, no palpable masses  INTEGUMENT: b/l UE bruising, b/l LE erythema, edema & blisters, LLE digits bruised w/ ulcer on dorsum   MUSCULOSKELETAL: no clubbing or cyanosis, no obvious deformity  NEUROLOGIC: awake, alert, oriented x3, moving all extremities, no obvious sensory deficits

## 2022-06-06 NOTE — H&P ADULT - NSHPREVIEWOFSYSTEMS_GEN_ALL_CORE
CONSTITUTIONAL: denies fever, chills, fatigue, weakness  HEENT: denies blurred vision, sore throat  SKIN: admits to easy bruising   CARDIOVASCULAR: denies chest pain, palpitations  RESPIRATORY: admits to shortness of breath on exertion, denies cough  GASTROINTESTINAL: admits to x1 loose BM yesterday, denies nausea, vomiting, abdominal pain, hematochezia   GENITOURINARY: denies dysuria, discharge, hematuria   NEUROLOGICAL: denies headache, focal weakness  MUSCULOSKELETAL: admits to b/l LE pain  HEMATOLOGIC: admits to UE and LLE bruising, denies gross bleeding  LYMPHATICS: admits to b/l LE swelling CONSTITUTIONAL: denies fever, chills, fatigue, weakness  HEENT: denies blurred vision, sore throat  SKIN: admits to easy bruising   CARDIOVASCULAR: denies chest pain, palpitations  RESPIRATORY: admits to shortness of breath on exertion, denies cough  GASTROINTESTINAL: admits to x1 loose BM yesterday, denies nausea, vomiting, abdominal pain, hematochezia   GENITOURINARY: denies dysuria, discharge, hematuria   NEUROLOGICAL: denies headache, focal weakness  MUSCULOSKELETAL: admits to b/l LE pain and chronic back pain  HEMATOLOGIC: admits to UE and LLE bruising, denies gross bleeding  LYMPHATICS: admits to b/l LE swelling

## 2022-06-06 NOTE — CONSULT NOTE ADULT - ASSESSMENT
58 yo female with PMHx MM, DD, HTN, chronic back pain presenting to the ED with increased lower extremity edema and shortness of breath since past few days.    #Shortness of breath  - Patient presenting with B/L LE edema and shortness of breath since past few days   - In the ED D-dimer 723 and Pro BNP 20   - US LE shows no evidence of DVT  - CT Angio chest negative for PE  - EKG ordered  - TTE ordered- f/u results  - ?Unclear if patient follows with cardiology outpatient- unable to remember name of cardiologist   - Patient is not complaining of any cardiac symptoms at this time.  - Monitor closely for the development of anginal symptoms or clinical signs of ischemia.   - BP well controlled, monitor routine hemodynamics.  - Continue ___.  - Monitor and replete lytes, keep K>4, Mg>2.  - Strict I/Os, daily weights.  - Other cardiovascular workup will depend on clinical course.  - All other workup per primary team.  - Will continue to follow.             56 yo female with PMHx MM, DD, HTN, chronic back pain presenting to the ED with increased lower extremity edema and shortness of breath since past few days.    #Shortness of breath  - Patient presenting with B/L LE edema and shortness of breath since past few days   - In the ED D-dimer 723 and Pro BNP 20   - US LE shows no evidence of DVT  - CT Angio chest negative for PE  - s/p IV Lasix 40mg x1   - EKG ordered  - TTE ordered- f/u results  - ?Unclear if patient follows with cardiology outpatient- unable to remember name of cardiologist   - Patient is not complaining of any cardiac symptoms at this time.  - Monitor closely for the development of anginal symptoms or clinical signs of ischemia.   - BP well controlled, monitor routine hemodynamics.  - Continue ___.  - Monitor and replete lytes, keep K>4, Mg>2.  - Strict I/Os, daily weights.  - Other cardiovascular workup will depend on clinical course.  - All other workup per primary team.  - Will continue to follow.             58 yo female with PMHx MM, DD, HTN, chronic back pain presenting to the ED with increased lower extremity edema and shortness of breath since past few days.    #Shortness of breath  - Patient presenting with B/L LE edema and shortness of breath since past few days   - In the ED D-dimer 723 and Pro BNP 20   - US LE shows no evidence of DVT  - CT Angio chest negative for PE  - s/p IV Lasix 40mg x1   - Continue IV Lasix 40mg daily   - EKG ordered- f/u results   - TTE ordered- f/u results  - Patient follows with cardiology outpatient- Dr. Keith Baltazar   - Patient is not complaining of any cardiac symptoms at this time.  - Monitor closely for the development of anginal symptoms or clinical signs of ischemia.   - BP well controlled, monitor routine hemodynamics.  - Monitor and replete lytes, keep K>4, Mg>2.  - Strict I/Os, daily weights.  - Other cardiovascular workup will depend on clinical course.  - All other workup per primary team.  - Will continue to follow.

## 2022-06-06 NOTE — ED PROVIDER NOTE - OBJECTIVE STATEMENT
pt bib ems for le edema, sob, unsure how long she has had symptoms. pt poor historian secondary to developmental delay, but denies fever, chills, cp, abd pain, d/n/v.  pmd - judy

## 2022-06-06 NOTE — H&P ADULT - ASSESSMENT
**Charting in progress***  58 yo female with PMHx MM, DD, HTN, chronic back pain admitted with increased lower extremity edema and shortness of breath work up   58 yo female with PMHx MM, DD, HTN, chronic back pain presenting to the ED with increased lower extremity edema since the past few days. Admitted for b/l LE cellulitis.      ED course   Vitals: T 97.6 F, ->98, /85, SpO2 97% on RA   Patient has received lasix 40 IVP and Zosyn 3.375g IV          58 yo female with PMHx MM, DD, HTN, chronic back pain presenting to the ED with increased lower extremity edema since the past few days. Admitted for b/l LE cellulitis.

## 2022-06-06 NOTE — H&P ADULT - NSICDXPASTSURGICALHX_GEN_ALL_CORE_FT
PAST SURGICAL HISTORY:  S/P removal of ovarian cyst     S/P removal of ovarian cyst      PAST SURGICAL HISTORY:  H/O basal cell carcinoma excision     S/P removal of ovarian cyst     S/P removal of ovarian cyst

## 2022-06-06 NOTE — H&P ADULT - ATTENDING COMMENTS
poor self care  weight gain  leg edema  continue iv lasix  monitor intake and output on lasix   narrow abx coverage in next 1-2 days in d/w ID.  f/up wound care recs  avoid opiates for pain  PT/SW eval  fall precautions

## 2022-06-06 NOTE — H&P ADULT - PROBLEM SELECTOR PLAN 1
Patient with shortness of breath with progressively worsening LE edema  - D-dimer elevated to 723  - B/l LE doppler negative for DVT, CT Chest Angio- negative for PE, CXR with clear lungs  - S/p lasix 40mg IV, continue   - Strict I/O's, daily weights   - F/u ECHO  - Cardiology, Dr. Espitia following B/l LE swelling with oozing, no signs of sepsis; LE edema can be partly 2/2 lymphatic insufficiency   - S/p Zosyn IV in ED, continue   - s/p lasix 40mg IV, continue   - PRN Tylenol 650 mg PO q6h for pain and/or fever  - Blood & wound cultures, lactate   - wound care, PT   - ID consulted, f/u recs B/l LE swelling with oozing, no signs of sepsis; LE edema can be partly 2/2 lymphatic insufficiency .  r/o dCHF  - S/p Zosyn IV in ED, continue   - s/p lasix 40mg IV, continue   - PRN Tylenol 650 mg PO q6h for pain and/or fever  - Blood & wound cultures, lactate   - wound care, PT   - ID consulted, f/u recs

## 2022-06-06 NOTE — ED ADULT NURSE NOTE - OBJECTIVE STATEMENT
patient came in ED from home BIBA with c/o persistent bilateral lower edema X unknown duration with multiple areas of bruising all over the body and open blister on top of left foot. patient reported few days ago she was s/e at [facility] and was discharged without a clear instruction on how to take care of the issue. patient admit to be having learning disability, lives by herself monitored by someone. alert and oriented x 4. afebrile. non-labored respiration noted. patient added she normally walks with a walker, have chronic back pain and bruises easily. multiple bruising noted on all over the body. patient is urinary incontinent, bowel continent. on fall precaution.

## 2022-06-06 NOTE — H&P ADULT - NSICDXPASTMEDICALHX_GEN_ALL_CORE_FT
PAST MEDICAL HISTORY:  High cholesterol     HTN (hypertension)     Mild developmental delay     Multiple myeloma      PAST MEDICAL HISTORY:  Anxiety     High cholesterol     HTN (hypertension)     Mild developmental delay     Multiple myeloma     Pre-diabetes     Sciatica

## 2022-06-06 NOTE — H&P ADULT - NSICDXFAMILYHX_GEN_ALL_CORE_FT
FAMILY HISTORY:  Sibling  Still living? Unknown  FH: non-Hodgkin's lymphoma, Age at diagnosis: Age Unknown

## 2022-06-06 NOTE — H&P ADULT - NSHPSOCIALHISTORY_GEN_ALL_CORE
Tobacco: denies  EtOH: denies  recreational drug use: denies  Lives with: alone in a complex arranged by agency for people with disabilities   Ambulates:  walker   ADLs: w/o assistance  Vaccinations: moderna x3

## 2022-06-06 NOTE — H&P ADULT - PROBLEM SELECTOR PLAN 2
B/l LE swelling with oozing, no signs of sepsis    - S/p Zosyn IV in ED, continue   - s/p lasix 40mg IV  - PRN Tylenol 650 mg PO q6h for pain and/or fever  - IV fluids   - Blood, urine & wound cultures, lactate   - wound care  - ID consulted, f/u recs B/l LE swelling with oozing, no signs of sepsis    - S/p Zosyn IV in ED, continue   - s/p lasix 40mg IV, continue   - PRN Tylenol 650 mg PO q6h for pain and/or fever  - Blood & wound cultures, lactate   - wound care, PT   - ID consulted, f/u recs Patient with shortness of breath with progressively worsening LE edema  - D-dimer elevated to 723  - B/l LE doppler negative for DVT, CT Chest Angio- negative for PE, CXR with clear lungs  - S/p lasix 40mg IV, continue   - Strict I/O's, daily weights   - F/u ECHO  - Cardiology, Dr. Espitia following Patient with shortness of breath with progressively worsening LE edema  - D-dimer elevated to 723  - B/l LE doppler negative for DVT, CT Chest Angio- negative for PE or aortic aneurysm, CXR with clear lungs  - S/p lasix 40mg IV, continue   - Strict I/O's, daily weights   - F/u ECHO r.o dCHF  - Cardiology, Dr. Espitia following

## 2022-06-06 NOTE — H&P ADULT - HISTORY OF PRESENT ILLNESS
***Charting in progress***  Patient is a 56 yo female with PMHx MM, DD, HTN, chronic back pain presenting to the ED with increased lower extremity edema since the past few days. Patient states she has been feeling more short of breath with activity since the past few days. Poor historian 2/2 developmental delay. She states she wants to enroll into a group home as she is not able to take care of herself. Patient denies fevers, chills, chest pain, palpitations, headache, dizziness, abdominal pain, n/v/d.    ED course   Vitals: T 97.6 F, ->98, /85, SpO2 97% on RA   Significant labs: D-dimer 723  CXR: Grossly normal lungs   CT Angio Chest PE: Limited study. No definite evidence of acute pulmonary embolism within the limits of the study. No segmental consolidations  Venous doppler b/l LE: No DVT in either lower extremity   EKG: NSR at 99bpm   Patient has received lasix 40 IVP and Zosyn 3.375g IV     Cardiology: Dr. Espitia consulted by ED   Patient is a 58 yo female with PMHx MM, DD, HLD, anxiety/depression, sciatica, pre-diabetes, HTN, chronic back pain presenting to the ED with increased lower extremity edema since the past few days. She states she has b/l 7/10 pain in her lower extremities, worse with movement. Patient states she has been feeling more short of breath with activity since the past few days. She states she has gained weight in the last 2 months, but is unsure how much weight, likely due to her poor diet and difficulty in taking care of herself. Pt went to [facility] on 6/3 for the same complaint where her LE doppler was negative for DVT. She states her LE were dressed. Pt is a poor historian 2/2 developmental delay. Of note, pt states she was walking outside with her friend early Saturday AM when her friend fell and "took her with her", causing the pt to fall on gravel and possibly get the gravel in her eyes. She was on the ground for 5 minutes before she was helped up, denies LOC and head trauma. Fall was not witnessed by anyone besides her friend who also fell. Pt will be enrolling in a group home in July/August as she cannot manage her own care. Patient denies fevers, chills, chest pain, palpitations, headache, dizziness, abdominal pain, n/v.    ED course   Vitals: T 97.6 F, ->98, /85, SpO2 97% on RA   Significant labs: D-dimer 723  CXR: Grossly normal lungs   CT Angio Chest PE: Limited study. No definite evidence of acute pulmonary embolism within the limits of the study. No segmental consolidations  Venous doppler b/l LE: No DVT in either lower extremity   EKG: NSR at 99bpm   Patient has received lasix 40 IVP and Zosyn 3.375g IV     Cardiology: Dr. Espitia consulted by ED   Patient is a 56 yo female with PMHx MM, DD, HLD, anxiety/depression, sciatica, pre-diabetes, HTN, chronic back pain, mild mental retardation presenting to the ED with increased lower extremity edema since the past few days. She states she has b/l 7/10 pain in her lower extremities, worse with movement. Patient states she has been feeling more short of breath with activity since the past few days. She states she has gained weight in the last 2 months, but is unsure how much weight, likely due to her poor diet and difficulty in taking care of herself. Pt went to [facility] on 6/3 for the same complaint where her LE doppler was negative for DVT. She states her LE were dressed. Pt is a poor historian 2/2 developmental delay. Of note, pt states she was walking outside with her friend early Saturday AM when her friend fell and "took her with her", causing the pt to fall on gravel and possibly get the gravel in her eyes. She was on the ground for 5 minutes before she was helped up, denies LOC and head trauma. Fall was not witnessed by anyone besides her friend who also fell. Pt will be enrolling in a group home in July/August as she cannot manage her own care. Patient denies fevers, chills, chest pain, palpitations, headache, dizziness, abdominal pain, n/v.    ED course   Vitals: T 97.6 F, ->98, /85, SpO2 97% on RA   Significant labs: D-dimer 723  CXR: Grossly normal lungs   CT Angio Chest PE: Limited study. No definite evidence of acute pulmonary embolism within the limits of the study. No segmental consolidations  Venous doppler b/l LE: No DVT in either lower extremity   EKG: NSR at 99bpm   Patient has received lasix 40 IVP and Zosyn 3.375g IV     Cardiology: Dr. Espitia consulted by ED

## 2022-06-06 NOTE — H&P ADULT - PROBLEM SELECTOR PLAN 3
continue statin Chronic  -Receives transfusions q4 weeks w/ Oncologist Dr. Mak Amin  -No active issues

## 2022-06-06 NOTE — ED ADULT NURSE REASSESSMENT NOTE - NS ED NURSE REASSESS COMMENT FT1
sister of the patient Alisson crawford who lives in the [facility] gave her number [phone]. sister emphasized the 3 hour time difference. but is willing to get updates from the medical team.

## 2022-06-06 NOTE — CONSULT NOTE ADULT - SUBJECTIVE AND OBJECTIVE BOX
----CHARTING IN PROGRESS----    [facility]         Nico Ugarte, Grey, Tanvi, Asher Espitia Savella        [phone] (office)    Reason for Consult:    Interval HPI: Patient seen and examined at bedside. No acute events overnight.     HPI: Patient is a 58 yo female with PMHx MM, DD, HTN, chronic back pain presenting to the ED with increased lower extremity edema since the past few days. Patient states she has been feeling more short of breath with activity since the past few days. Unable to remember her outpatient cardiologist and other parts of history 2/2 developmental delay. She states she wants to enroll into a group home as she is not able to take care of herself. Patient denies fevers, chills, chest pain, palpitations, headache, dizziness, abdominal pain, n/v/d.      PAST MEDICAL & SURGICAL HISTORY:  Multiple myeloma      Mild developmental delay      HTN (hypertension)      High cholesterol      S/P removal of ovarian cyst      S/P removal of ovarian cyst          SOCIAL HISTORY: No active tobacco, alcohol or illicit drug use    FAMILY HISTORY: denies       Home Medications:      MEDICATIONS  (STANDING):    MEDICATIONS  (PRN):      Allergies    No Known Allergies    Intolerances        REVIEW OF SYSTEMS: Negative except as per HPI.    VITAL SIGNS:   Vital Signs Last 24 Hrs  T(C): 36.8 (06 Jun 2022 13:42), Max: 36.8 (06 Jun 2022 11:57)  T(F): 98.3 (06 Jun 2022 13:42), Max: 98.3 (06 Jun 2022 11:57)  HR: 90 (06 Jun 2022 13:42) (90 - 110)  BP: 113/63 (06 Jun 2022 13:42) (113/63 - 147/85)  BP(mean): --  RR: 16 (06 Jun 2022 13:42) (16 - 16)  SpO2: 100% (06 Jun 2022 13:42) (94% - 100%)    I&O's Summary      PHYSICAL EXAM:  Constitutional: NAD, well-developed  HEENT NC/AT, moist mucous membranes  Pulmonary: Non-labored, breath sounds are clear bilaterally, no wheezing, rales or rhonchi  Cardiovascular: +S1, S2, RRR, no murmur  Gastrointestinal: Soft, nontender, nondistended, normoactive bowel sounds  Extremities: 3+ BL LE edema, erythema, warmth and tenderness    Neurological: Alert, strength and sensitivity are grossly intact  Skin: BL LE edema, erythema, warmth and tenderness w/ active blisters   Psych: Mood & affect appropriate    LABS: All Labs Reviewed:                        11.8   10.15 )-----------( 296      ( 06 Jun 2022 12:10 )             35.9     06 Jun 2022 12:10    138    |  104    |  15     ----------------------------<  98     3.5     |  29     |  0.88     Ca    9.0        06 Jun 2022 12:10    TPro  5.9    /  Alb  3.0    /  TBili  0.3    /  DBili  x      /  AST  39     /  ALT  60     /  AlkPhos  92     06 Jun 2022 12:10          Blood Culture:   06-06 @ 12:10  Pro Bnp 20       [facility]         Nico Ugarte, Grey, Tanvi, Nuupr, Asher, Aram        [phone] (office)    Reason for Consult:    Interval HPI: Patient seen and examined at bedside. No acute events overnight.     HPI: Patient is a 58 yo female with PMHx MM, DD, HTN, chronic back pain presenting to the ED with increased lower extremity edema since the past few days. Patient states she has been feeling more short of breath with activity since the past few days. Unable to remember her outpatient cardiologist and other parts of history 2/2 developmental delay. She states she wants to enroll into a group home as she is not able to take care of herself. Patient denies fevers, chills, chest pain, palpitations, headache, dizziness, abdominal pain, n/v/d.      PAST MEDICAL & SURGICAL HISTORY:  Multiple myeloma      Mild developmental delay      HTN (hypertension)      High cholesterol      S/P removal of ovarian cyst      S/P removal of ovarian cyst          SOCIAL HISTORY: No active tobacco, alcohol or illicit drug use    FAMILY HISTORY: denies       Home Medications:      MEDICATIONS  (STANDING):    MEDICATIONS  (PRN):      Allergies    No Known Allergies    Intolerances        REVIEW OF SYSTEMS: Negative except as per HPI.    VITAL SIGNS:   Vital Signs Last 24 Hrs  T(C): 36.8 (06 Jun 2022 13:42), Max: 36.8 (06 Jun 2022 11:57)  T(F): 98.3 (06 Jun 2022 13:42), Max: 98.3 (06 Jun 2022 11:57)  HR: 90 (06 Jun 2022 13:42) (90 - 110)  BP: 113/63 (06 Jun 2022 13:42) (113/63 - 147/85)  BP(mean): --  RR: 16 (06 Jun 2022 13:42) (16 - 16)  SpO2: 100% (06 Jun 2022 13:42) (94% - 100%)    I&O's Summary      PHYSICAL EXAM:  Constitutional: NAD, well-developed  HEENT NC/AT, moist mucous membranes  Pulmonary: Non-labored, breath sounds are clear bilaterally, no wheezing, rales or rhonchi  Cardiovascular: +S1, S2, RRR, no murmur  Gastrointestinal: Soft, nontender, nondistended, normoactive bowel sounds  Extremities: 3+ BL LE edema, erythema, warmth and tenderness    Neurological: Alert, strength and sensitivity are grossly intact  Skin: BL LE edema, erythema, warmth and tenderness w/ active blisters   Psych: Mood & affect appropriate    LABS: All Labs Reviewed:                        11.8   10.15 )-----------( 296      ( 06 Jun 2022 12:10 )             35.9     06 Jun 2022 12:10    138    |  104    |  15     ----------------------------<  98     3.5     |  29     |  0.88     Ca    9.0        06 Jun 2022 12:10    TPro  5.9    /  Alb  3.0    /  TBili  0.3    /  DBili  x      /  AST  39     /  ALT  60     /  AlkPhos  92     06 Jun 2022 12:10          Blood Culture:   06-06 @ 12:10  Pro Bnp 20

## 2022-06-07 DIAGNOSIS — R60.0 LOCALIZED EDEMA: ICD-10-CM

## 2022-06-07 LAB
A1C WITH ESTIMATED AVERAGE GLUCOSE RESULT: 6.6 % — HIGH (ref 4–5.6)
ALBUMIN SERPL ELPH-MCNC: 3 G/DL — LOW (ref 3.3–5)
ALP SERPL-CCNC: 99 U/L — SIGNIFICANT CHANGE UP (ref 40–120)
ALT FLD-CCNC: 68 U/L — SIGNIFICANT CHANGE UP (ref 12–78)
ANION GAP SERPL CALC-SCNC: 8 MMOL/L — SIGNIFICANT CHANGE UP (ref 5–17)
AST SERPL-CCNC: 34 U/L — SIGNIFICANT CHANGE UP (ref 15–37)
BASOPHILS # BLD AUTO: 0.02 K/UL — SIGNIFICANT CHANGE UP (ref 0–0.2)
BASOPHILS NFR BLD AUTO: 0.2 % — SIGNIFICANT CHANGE UP (ref 0–2)
BILIRUB SERPL-MCNC: 0.4 MG/DL — SIGNIFICANT CHANGE UP (ref 0.2–1.2)
BUN SERPL-MCNC: 13 MG/DL — SIGNIFICANT CHANGE UP (ref 7–23)
CALCIUM SERPL-MCNC: 9.2 MG/DL — SIGNIFICANT CHANGE UP (ref 8.5–10.1)
CHLORIDE SERPL-SCNC: 100 MMOL/L — SIGNIFICANT CHANGE UP (ref 96–108)
CO2 SERPL-SCNC: 32 MMOL/L — HIGH (ref 22–31)
CREAT SERPL-MCNC: 0.95 MG/DL — SIGNIFICANT CHANGE UP (ref 0.5–1.3)
EGFR: 70 ML/MIN/1.73M2 — SIGNIFICANT CHANGE UP
EOSINOPHIL # BLD AUTO: 0.14 K/UL — SIGNIFICANT CHANGE UP (ref 0–0.5)
EOSINOPHIL NFR BLD AUTO: 1.7 % — SIGNIFICANT CHANGE UP (ref 0–6)
ESTIMATED AVERAGE GLUCOSE: 143 MG/DL — HIGH (ref 68–114)
GLUCOSE SERPL-MCNC: 117 MG/DL — HIGH (ref 70–99)
HCT VFR BLD CALC: 38.3 % — SIGNIFICANT CHANGE UP (ref 34.5–45)
HCV AB S/CO SERPL IA: 0.07 S/CO — SIGNIFICANT CHANGE UP (ref 0–0.99)
HCV AB SERPL-IMP: SIGNIFICANT CHANGE UP
HGB BLD-MCNC: 12.5 G/DL — SIGNIFICANT CHANGE UP (ref 11.5–15.5)
IMM GRANULOCYTES NFR BLD AUTO: 2 % — HIGH (ref 0–1.5)
LYMPHOCYTES # BLD AUTO: 1.09 K/UL — SIGNIFICANT CHANGE UP (ref 1–3.3)
LYMPHOCYTES # BLD AUTO: 13.4 % — SIGNIFICANT CHANGE UP (ref 13–44)
MCHC RBC-ENTMCNC: 32.4 PG — SIGNIFICANT CHANGE UP (ref 27–34)
MCHC RBC-ENTMCNC: 32.6 GM/DL — SIGNIFICANT CHANGE UP (ref 32–36)
MCV RBC AUTO: 99.2 FL — SIGNIFICANT CHANGE UP (ref 80–100)
MONOCYTES # BLD AUTO: 0.95 K/UL — HIGH (ref 0–0.9)
MONOCYTES NFR BLD AUTO: 11.7 % — SIGNIFICANT CHANGE UP (ref 2–14)
NEUTROPHILS # BLD AUTO: 5.78 K/UL — SIGNIFICANT CHANGE UP (ref 1.8–7.4)
NEUTROPHILS NFR BLD AUTO: 71 % — SIGNIFICANT CHANGE UP (ref 43–77)
NRBC # BLD: 0 /100 WBCS — SIGNIFICANT CHANGE UP (ref 0–0)
PLATELET # BLD AUTO: 306 K/UL — SIGNIFICANT CHANGE UP (ref 150–400)
POTASSIUM SERPL-MCNC: 3 MMOL/L — LOW (ref 3.5–5.3)
POTASSIUM SERPL-SCNC: 3 MMOL/L — LOW (ref 3.5–5.3)
PROT SERPL-MCNC: 6.3 G/DL — SIGNIFICANT CHANGE UP (ref 6–8.3)
RBC # BLD: 3.86 M/UL — SIGNIFICANT CHANGE UP (ref 3.8–5.2)
RBC # FLD: 13.3 % — SIGNIFICANT CHANGE UP (ref 10.3–14.5)
SODIUM SERPL-SCNC: 140 MMOL/L — SIGNIFICANT CHANGE UP (ref 135–145)
WBC # BLD: 8.14 K/UL — SIGNIFICANT CHANGE UP (ref 3.8–10.5)
WBC # FLD AUTO: 8.14 K/UL — SIGNIFICANT CHANGE UP (ref 3.8–10.5)

## 2022-06-07 PROCEDURE — 99233 SBSQ HOSP IP/OBS HIGH 50: CPT

## 2022-06-07 PROCEDURE — 99232 SBSQ HOSP IP/OBS MODERATE 35: CPT

## 2022-06-07 RX ORDER — POTASSIUM CHLORIDE 20 MEQ
40 PACKET (EA) ORAL EVERY 4 HOURS
Refills: 0 | Status: COMPLETED | OUTPATIENT
Start: 2022-06-07 | End: 2022-06-07

## 2022-06-07 RX ORDER — CEFTRIAXONE 500 MG/1
1000 INJECTION, POWDER, FOR SOLUTION INTRAMUSCULAR; INTRAVENOUS EVERY 24 HOURS
Refills: 0 | Status: DISCONTINUED | OUTPATIENT
Start: 2022-06-07 | End: 2022-06-12

## 2022-06-07 RX ORDER — ASPIRIN/CALCIUM CARB/MAGNESIUM 324 MG
81 TABLET ORAL DAILY
Refills: 0 | Status: DISCONTINUED | OUTPATIENT
Start: 2022-06-07 | End: 2022-06-12

## 2022-06-07 RX ORDER — ACYCLOVIR SODIUM 500 MG
200 VIAL (EA) INTRAVENOUS
Refills: 0 | Status: DISCONTINUED | OUTPATIENT
Start: 2022-06-07 | End: 2022-06-12

## 2022-06-07 RX ORDER — POTASSIUM CHLORIDE 20 MEQ
40 PACKET (EA) ORAL EVERY 4 HOURS
Refills: 0 | Status: DISCONTINUED | OUTPATIENT
Start: 2022-06-07 | End: 2022-06-07

## 2022-06-07 RX ADMIN — GABAPENTIN 300 MILLIGRAM(S): 400 CAPSULE ORAL at 22:35

## 2022-06-07 RX ADMIN — Medication 40 MILLIEQUIVALENT(S): at 14:06

## 2022-06-07 RX ADMIN — SENNA PLUS 2 TABLET(S): 8.6 TABLET ORAL at 22:35

## 2022-06-07 RX ADMIN — Medication 200 MILLIGRAM(S): at 18:48

## 2022-06-07 RX ADMIN — MONTELUKAST 10 MILLIGRAM(S): 4 TABLET, CHEWABLE ORAL at 22:35

## 2022-06-07 RX ADMIN — ENOXAPARIN SODIUM 40 MILLIGRAM(S): 100 INJECTION SUBCUTANEOUS at 17:10

## 2022-06-07 RX ADMIN — POLYETHYLENE GLYCOL 3350 17 GRAM(S): 17 POWDER, FOR SOLUTION ORAL at 11:58

## 2022-06-07 RX ADMIN — BUPROPION HYDROCHLORIDE 150 MILLIGRAM(S): 150 TABLET, EXTENDED RELEASE ORAL at 11:57

## 2022-06-07 RX ADMIN — Medication 81 MILLIGRAM(S): at 11:57

## 2022-06-07 RX ADMIN — CEFTRIAXONE 100 MILLIGRAM(S): 500 INJECTION, POWDER, FOR SOLUTION INTRAMUSCULAR; INTRAVENOUS at 11:58

## 2022-06-07 RX ADMIN — ENOXAPARIN SODIUM 40 MILLIGRAM(S): 100 INJECTION SUBCUTANEOUS at 06:17

## 2022-06-07 RX ADMIN — Medication 40 MILLIEQUIVALENT(S): at 17:10

## 2022-06-07 RX ADMIN — PIPERACILLIN AND TAZOBACTAM 25 GRAM(S): 4; .5 INJECTION, POWDER, LYOPHILIZED, FOR SOLUTION INTRAVENOUS at 06:16

## 2022-06-07 RX ADMIN — Medication 40 MILLIGRAM(S): at 06:16

## 2022-06-07 RX ADMIN — ATORVASTATIN CALCIUM 20 MILLIGRAM(S): 80 TABLET, FILM COATED ORAL at 22:35

## 2022-06-07 NOTE — PROGRESS NOTE ADULT - SUBJECTIVE AND OBJECTIVE BOX
Duy Jacobs MD  [phone]    SUBJECTIVE:  Resting in ED stretcher.  No SOB on RA.  No nausea.  Reports b/l leg pain, but cannot describe what the pain feels like.  Has chronic back pain due to sciatica and spinal stenosis, but says that leg pain does not radiate from the back.  NAD.    Tele: NSR    MEDICATIONS  (STANDING):  atorvastatin 20 milliGRAM(s) Oral at bedtime  buPROPion XL (24-Hour) . 150 milliGRAM(s) Oral daily  enoxaparin Injectable 40 milliGRAM(s) SubCutaneous every 12 hours  furosemide   Injectable 40 milliGRAM(s) IV Push daily  gabapentin 300 milliGRAM(s) Oral at bedtime  montelukast 10 milliGRAM(s) Oral at bedtime  piperacillin/tazobactam IVPB.. 3.375 Gram(s) IV Intermittent every 8 hours  polyethylene glycol 3350 17 Gram(s) Oral daily  potassium chloride   Powder 40 milliEquivalent(s) Oral every 4 hours  senna 2 Tablet(s) Oral at bedtime    MEDICATIONS  (PRN):  acetaminophen     Tablet .. 975 milliGRAM(s) Oral every 8 hours PRN Moderate Pain (4 - 6)  LORazepam     Tablet 0.5 milliGRAM(s) Oral daily PRN Anxiety      Vital Signs Last 24 Hrs  T(C): 36.8 (06 Jun 2022 13:42), Max: 36.8 (06 Jun 2022 11:57)  T(F): 98.3 (06 Jun 2022 13:42), Max: 98.3 (06 Jun 2022 11:57)  HR: 83 (07 Jun 2022 06:07) (78 - 110)  BP: 145/66 (07 Jun 2022 06:07) (113/63 - 149/79)  BP(mean): --  RR: 18 (07 Jun 2022 06:07) (16 - 18)  SpO2: 99% (07 Jun 2022 06:07) (94% - 100%)    CAPILLARY BLOOD GLUCOSE        I&O's Summary    06 Jun 2022 07:01  -  07 Jun 2022 07:00  --------------------------------------------------------  IN: 0 mL / OUT: 1800 mL / NET: -1800 mL        PHYSICAL EXAM:  GENERAL: Looks stated age, NAD  CARDIOVASCULAR: Normal S1, S2  PULMONARY: Lungs clear to auscultation bilaterally. No wheezes/rales/rhonchi  GI: Abdomen non-distended, soft, Nontender.  Bowel sounds present  MSK/Ext:  1+ leg edema b/l.  Mild leg erythema, L>R.  Small blisters on each leg.  Blood blister on dorsum of left foot with some skin breakdown.  Large blister on right lateral ankle, about the size of a half dollar.  No calf tenderness bilaterally.  PSYCH: Normal Affect. AAOx3      LABS:                        12.5   8.14  )-----------( 306      ( 07 Jun 2022 06:19 )             38.3     06-07    140  |  100  |  13  ----------------------------<  117<H>  3.0<L>   |  32<H>  |  0.95    Ca    9.2      07 Jun 2022 06:19    TPro  6.3  /  Alb  3.0<L>  /  TBili  0.4  /  DBili  x   /  AST  34  /  ALT  68  /  AlkPhos  99  06-07                RADIOLOGY & ADDITIONAL TESTS:

## 2022-06-07 NOTE — PHYSICAL THERAPY INITIAL EVALUATION ADULT - ADDITIONAL COMMENTS
Pt lives alone in an apartment complex for people who are disabled. Pt stated that she was preparing to move into a group home as she was having trouble and needed more help with her ADLs. Pt reports a history of frequent falls. Pt ambulated IND with a walker prior to admission and required her friends assistance with ADL's. Pt lives alone in an apartment complex for people who are disabled. Pt stated that she was preparing to move into a group home as she was having trouble and needed more help with her ADLs. Pt reports a history of frequent falls. Pt ambulated IND with a walker prior to admission and required her friends assistance with ADL's. pt recently d/c from [facility] with LE cellulitis.

## 2022-06-07 NOTE — PROGRESS NOTE ADULT - PROBLEM SELECTOR PLAN 9
A1C 6.6 diagnostic for DM.  Will change to consistent carb diet.  Finger stick before meals and at bedtime.  Add sliding scale if finger sticks >200.  Will request nutrition consult.  SW/CM also consulted regarding living situation, possible placement.

## 2022-06-07 NOTE — PHYSICAL THERAPY INITIAL EVALUATION ADULT - PHYSICAL ASSIST/NONPHYSICAL ASSIST: SIT/SUPINE, REHAB EVAL
Qing Duff, MS RD N [facility], #[phone]. Qing Duff, MS RD N [facility], #[phone]. Qing Duff, MS RD N [facility], #[phone]. verbal cues/1 person assist

## 2022-06-07 NOTE — CONSULT NOTE ADULT - SUBJECTIVE AND OBJECTIVE BOX
HPI:  Patient is a 56 yo female with PMHx MM, DD, HLD, anxiety/depression, sciatica, pre-diabetes, HTN, chronic back pain, mild mental retardation presenting to the ED with increased lower extremity edema since the past few days. She states she has b/l 7/10 pain in her lower extremities, worse with movement. Patient states she has been feeling more short of breath with activity since the past few days. She states she has gained weight in the last 2 months, but is unsure how much weight, likely due to her poor diet and difficulty in taking care of herself. Pt went to [facility] on 6/3 for the same complaint where her LE doppler was negative for DVT. She states her LE were dressed. Pt is a poor historian 2/2 developmental delay. Of note, pt states she was walking outside with her friend early Saturday AM when her friend fell and "took her with her", causing the pt to fall on gravel and possibly get the gravel in her eyes. She was on the ground for 5 minutes before she was helped up, denies LOC and head trauma. Fall was not witnessed by anyone besides her friend who also fell. Pt will be enrolling in a group home in July/August as she cannot manage her own care. Patient denies fevers, chills, chest pain, palpitations, headache, dizziness, abdominal pain, n/v.        PAST MEDICAL & SURGICAL HISTORY:  Multiple myeloma      Mild developmental delay      HTN (hypertension)      High cholesterol      Sciatica      Anxiety      Pre-diabetes      S/P removal of ovarian cyst      S/P removal of ovarian cyst      H/O basal cell carcinoma excision      REVIEW OF SYSTEMS  CONSTITUTIONAL: +weakness, no fevers or chills  HEENT: denies blurred visions, sore throat  SKIN: + new wound left foot  CARDIOVASCULAR: denies chest pain, palpitations  GASTROINTESTINAL: denies nausea, vomiting, diarrhea, abdominal pain  all other ROS is negative unless indicated above        MEDICATIONS  (STANDING):  acyclovir   Oral Tab/Cap 200 milliGRAM(s) Oral two times a day  aspirin enteric coated 81 milliGRAM(s) Oral daily  atorvastatin 20 milliGRAM(s) Oral at bedtime  buPROPion XL (24-Hour) . 150 milliGRAM(s) Oral daily  cefTRIAXone   IVPB 1000 milliGRAM(s) IV Intermittent every 24 hours  enoxaparin Injectable 40 milliGRAM(s) SubCutaneous every 12 hours  furosemide   Injectable 40 milliGRAM(s) IV Push daily  gabapentin 300 milliGRAM(s) Oral at bedtime  montelukast 10 milliGRAM(s) Oral at bedtime  polyethylene glycol 3350 17 Gram(s) Oral daily  potassium chloride   Powder 40 milliEquivalent(s) Oral every 4 hours  senna 2 Tablet(s) Oral at bedtime    MEDICATIONS  (PRN):  acetaminophen     Tablet .. 975 milliGRAM(s) Oral every 8 hours PRN Moderate Pain (4 - 6)  LORazepam     Tablet 0.5 milliGRAM(s) Oral daily PRN Anxiety      Allergies    fragrance (Unknown)  No Known Drug Allergies  Pollen, animal hair (Unknown)    Intolerances    Milk (Unknown)      SOCIAL HISTORY:      FAMILY HISTORY:  FH: non-Hodgkins lymphoma (Sibling)        Vital Signs Last 24 Hrs  T(C): 36.8 (07 Jun 2022 12:30), Max: 36.8 (07 Jun 2022 12:30)  T(F): 98.2 (07 Jun 2022 12:30), Max: 98.2 (07 Jun 2022 12:30)  HR: 98 (07 Jun 2022 12:30) (78 - 98)  BP: 123/58 (07 Jun 2022 12:30) (123/58 - 149/79)  BP(mean): --  RR: 16 (07 Jun 2022 12:30) (16 - 18)  SpO2: 99% (07 Jun 2022 12:30) (96% - 100%)    NAD /   poor historian, confused at times/ Obese  Total Care Sport/ Versa Care P500 bed                            12.5   8.14  )-----------( 306      ( 07 Jun 2022 06:19 )             38.3     06-07    140  |  100  |  13  ----------------------------<  117<H>  3.0<L>   |  32<H>  |  0.95    Ca    9.2      07 Jun 2022 06:19    TPro  6.3  /  Alb  3.0<L>  /  TBili  0.4  /  DBili  x   /  AST  34  /  ALT  68  /  AlkPhos  99  06-07    Auto Neutrophil #: 5.78 K/uL (06-07-22 @ 06:19)    A1C with Estimated Average Glucose Result: 6.6 % (06-07-22 @ 08:58)      PHYSICAL EXAM:    General: resting comfortably in bed; NAD  ENT: no nasal discharge; hearing intact  Extremities: no clubbing or cyanosis; ++ Lower ext edema : no gross deformities, able to move all four extremities, cap refill <3 secs  Dermatologic: skin warm, open wound left dorsal foot, no drainage, no odor, contusion at right hip  Neurologic: awake, alert, follows commands,

## 2022-06-07 NOTE — CONSULT NOTE ADULT - ASSESSMENT
58 yo female with PMHx MM, DD, HLD, anxiety/depression, sciatica, pre-diabetes, HTN, chronic back pain, mild mental retardation presenting to the ED with increased lower extremity edema. Pt states she was walking outside with her friend early Saturday AM when her friend fell and "took her with her", causing the pt to fall on gravel Pt will be enrolling in a group home in July/August as she cannot manage her own care.    RECOMMENDATIONS  1-LLE Cellulitis- may be due to fall, then superinfected and now with infection, discussed with Dr Morataya and recommend  -CEFTRIAXONE for now    2-Multiple myeloma- agree with continued suppressive acyclovir, not sure if pt is on Bortezomib or other therapeutic that might trigger Associated Herpes Zoster in Patients with Multiple Myeloma    other issues will coordinate with medicine    Thank you for consulting us and involving us in the management of this most interesting and challenging case.  We will follow along in the care of this patient. Please call us at [phone] or text me directly on my cell# at [phone] with any concerns.

## 2022-06-07 NOTE — CONSULT NOTE ADULT - SUBJECTIVE AND OBJECTIVE BOX
[facility] DIVISION of INFECTIOUS DISEASE  Kiran Jorgensen MD PhD, Amber Stewart MD, Lizbeth Delgado MD, Jennifer Sterling MD, Yeison Rizo MD  and providing coverage with Yousif Solis MD  Providing Infectious Disease Consultations at [facility], [facility], [place], [place], [facility], [facility]    Office# [phone] to schedule follow up appointments  Answering Service for urgent calls or New Consults [phone]  Cell# to text for urgent issues Kiran Jameel [phone]     HPI:  Patient is a 58 yo female with PMHx MM, DD, HLD, anxiety/depression, sciatica, pre-diabetes, HTN, chronic back pain, mild mental retardation presenting to the ED with increased lower extremity edema since the past few days. She states she has b/l 7/10 pain in her lower extremities, worse with movement. Patient states she has been feeling more short of breath with activity since the past few days. She states she has gained weight in the last 2 months, but is unsure how much weight, likely due to her poor diet and difficulty in taking care of herself. Pt went to [facility] on 6/3 for the same complaint where her LE doppler was negative for DVT. She states her LE were dressed. Pt is a poor historian 2/2 developmental delay. Of note, pt states she was walking outside with her friend early Saturday AM when her friend fell and "took her with her", causing the pt to fall on gravel and possibly get the gravel in her eyes. She was on the ground for 5 minutes before she was helped up, denies LOC and head trauma. Fall was not witnessed by anyone besides her friend who also fell. Pt will be enrolling in a group home in July/August as she cannot manage her own care. Patient denies fevers, chills, chest pain, palpitations, headache, dizziness, abdominal pain, n/v.    ED course   Vitals: T 97.6 F, ->98, /85, SpO2 97% on RA   Significant labs: D-dimer 723  CXR: Grossly normal lungs   CT Angio Chest PE: Limited study. No definite evidence of acute pulmonary embolism within the limits of the study. No segmental consolidations  Venous doppler b/l LE: No DVT in either lower extremity   EKG: NSR at 99bpm   Patient has received lasix 40 IVP and Zosyn 3.375g IV     Cardiology: Dr. Espitia consulted by ED   (06 Jun 2022 16:36)      PAST MEDICAL & SURGICAL HISTORY:  Multiple myeloma      Mild developmental delay      HTN (hypertension)      High cholesterol      Sciatica      Anxiety      Pre-diabetes      S/P removal of ovarian cyst      S/P removal of ovarian cyst      H/O basal cell carcinoma excision          Antimicrobials  cefTRIAXone   IVPB 1000 milliGRAM(s) IV Intermittent every 24 hours      Immunological      Other  acetaminophen     Tablet .. 975 milliGRAM(s) Oral every 8 hours PRN  atorvastatin 20 milliGRAM(s) Oral at bedtime  buPROPion XL (24-Hour) . 150 milliGRAM(s) Oral daily  enoxaparin Injectable 40 milliGRAM(s) SubCutaneous every 12 hours  furosemide   Injectable 40 milliGRAM(s) IV Push daily  gabapentin 300 milliGRAM(s) Oral at bedtime  LORazepam     Tablet 0.5 milliGRAM(s) Oral daily PRN  montelukast 10 milliGRAM(s) Oral at bedtime  polyethylene glycol 3350 17 Gram(s) Oral daily  potassium chloride   Powder 40 milliEquivalent(s) Oral every 4 hours  senna 2 Tablet(s) Oral at bedtime      Allergies    fragrance (Unknown)  No Known Drug Allergies  Pollen, animal hair (Unknown)    Intolerances    Milk (Unknown)      SOCIAL HISTORY:  Social History:  Tobacco: denies  EtOH: denies  recreational drug use: denies  Lives with: alone in a complex arranged by agency for people with disabilities   Ambulates:  walker   ADLs: w/o assistance  Vaccinations: moderna x3 (06 Jun 2022 16:36)      FAMILY HISTORY:  FH: non-Hodgkin&#x27;s lymphoma (Sibling)        ROS:    EYES:  Negative  blurry vision or double vision  GASTROINTESTINAL:  Negative for nausea, vomiting, diarrhea  -otherwise negative except for subjective    Vital Signs Last 24 Hrs  T(C): 36.8 (06 Jun 2022 13:42), Max: 36.8 (06 Jun 2022 11:57)  T(F): 98.3 (06 Jun 2022 13:42), Max: 98.3 (06 Jun 2022 11:57)  HR: 83 (07 Jun 2022 06:07) (78 - 110)  BP: 145/66 (07 Jun 2022 06:07) (113/63 - 149/79)  BP(mean): --  RR: 18 (07 Jun 2022 06:07) (16 - 18)  SpO2: 99% (07 Jun 2022 06:07) (94% - 100%)    PE:  Obese in no distress  HEENT:  NC, PERRL, sclerae anicteric, conjunctivae clear, EOMI.  Sinuses nontender, no nasal exudate.  No buccal or pharyngeal lesions, erythema or exudate  Neck:  Supple, no adenopathy  Lungs:  No accessory muscle use, bilaterally clear to auscultation  Cor:  distant  Abd:  Symmetric, normoactive BS.  Soft, nontender, no masses, guarding or rebound.  Liver and spleen not enlarged  Extrem:  LE edema with L>R, multiple excoriations that appear infected and sig erythema and warmth of LLE.  Neuro: grossly intact  Musc: moving all limbs freely, no focal deficits        LABS:                        12.5   8.14  )-----------( 306      ( 07 Jun 2022 06:19 )             38.3       WBC Count: 8.14 K/uL (06-07-22 @ 06:19)  WBC Count: 10.15 K/uL (06-06-22 @ 12:10)      06-07    140  |  100  |  13  ----------------------------<  117<H>  3.0<L>   |  32<H>  |  0.95    Ca    9.2      07 Jun 2022 06:19    TPro  6.3  /  Alb  3.0<L>  /  TBili  0.4  /  DBili  x   /  AST  34  /  ALT  68  /  AlkPhos  99  06-07      Creatinine, Serum: 0.95 mg/dL (06-07-22 @ 06:19)  Creatinine, Serum: 0.88 mg/dL (06-06-22 @ 12:10)                MICROBIOLOGY:      RADIOLOGY & ADDITIONAL STUDIES:    --

## 2022-06-07 NOTE — PROGRESS NOTE ADULT - ASSESSMENT
56 yo female with PMHx MM, DD, HTN, chronic back pain presenting to the ED with increased lower extremity edema and shortness of breath since past few days.    - p/w c/o B/L LE edema and shortness of breath since past few days   - her LE edema appears cellulitic in nature  - Abx per primary     - D-dimer 723 and Pro BNP 20   - US LE shows no evidence of DVT  - CT Angio chest negative for PE  - remains volume overload on exam, she does states that her SOB has improved from yesterday and tolerating RA   - s/p IV Lasix 40mg x1, continue IV Lasix 40mg daily   - f/u TTE     - EKG: NSR, no acute ischemic changes noted   - denies any anginal symptoms   - follows Dr. Keith Rosales (o/p card) recently   - will obtain office records   - Monitor closely for the development of anginal symptoms or clinical signs of ischemia.     - BP and HR well controlled, monitor routine hemodynamics.    - Monitor and replete lytes, keep K>4, Mg>2.  - Strict I/Os, daily weights.    - Will continue to follow.    Ani Gunn [facility]  Nurse Practitioner - Cardiology   Spectra #[phone]

## 2022-06-07 NOTE — PATIENT PROFILE ADULT - FALL HARM RISK - HARM RISK INTERVENTIONS

## 2022-06-07 NOTE — PROGRESS NOTE ADULT - PROBLEM SELECTOR PLAN 1
Mild leg erythema on exam, L>R.  No fever or leukocytosis.  ID input appreciated and case d/w Dr. Jorgensen.  Suspect left leg cellulitis.  Changing antibiotics to Ceftriaxone.  Follow-up pending blood cultures.  Wound care consult.

## 2022-06-07 NOTE — PROGRESS NOTE ADULT - PROBLEM SELECTOR PLAN 2
Bilateral leg edema, suspect may be secondary to heart failure.  BNP 20, but may be lowered in setting of obesity.  Cardiology input appreciated.  Continue IV Lasix.  Echo ordered and pending.

## 2022-06-07 NOTE — ED ADULT NURSE REASSESSMENT NOTE - NS ED NURSE REASSESS COMMENT FT1
Patient slept comfortably through the night, in no acute distress at this time, will continue to monitor.

## 2022-06-07 NOTE — PROGRESS NOTE ADULT - SUBJECTIVE AND OBJECTIVE BOX
[facility] -- Nico Ugarte Grossman, [place], Asher Espitia Savella, Goodger: Office # [phone]    Follow Up:  SOB, LE swelling     Subjective/Observations: Patient seen and examined, awake in ED, alert, resting comfortably in bed, denies discomfort, chest pain, dyspnea, palpitations or dizziness. Tolerating room air. states feeling much better     REVIEW OF SYSTEMS: All review of systems is negative for eye, ENT, GI, , allergic, dermatologic, musculoskeletal and neurologic except as described above    PAST MEDICAL & SURGICAL HISTORY:  Multiple myeloma      Mild developmental delay      HTN (hypertension)      High cholesterol      Sciatica      Anxiety      Pre-diabetes      S/P removal of ovarian cyst      S/P removal of ovarian cyst      H/O basal cell carcinoma excision          MEDICATIONS  (STANDING):  acyclovir   Oral Tab/Cap 200 milliGRAM(s) Oral two times a day  aspirin enteric coated 81 milliGRAM(s) Oral daily  atorvastatin 20 milliGRAM(s) Oral at bedtime  buPROPion XL (24-Hour) . 150 milliGRAM(s) Oral daily  cefTRIAXone   IVPB 1000 milliGRAM(s) IV Intermittent every 24 hours  enoxaparin Injectable 40 milliGRAM(s) SubCutaneous every 12 hours  furosemide   Injectable 40 milliGRAM(s) IV Push daily  gabapentin 300 milliGRAM(s) Oral at bedtime  montelukast 10 milliGRAM(s) Oral at bedtime  polyethylene glycol 3350 17 Gram(s) Oral daily  potassium chloride   Powder 40 milliEquivalent(s) Oral every 4 hours  senna 2 Tablet(s) Oral at bedtime    MEDICATIONS  (PRN):  acetaminophen     Tablet .. 975 milliGRAM(s) Oral every 8 hours PRN Moderate Pain (4 - 6)  LORazepam     Tablet 0.5 milliGRAM(s) Oral daily PRN Anxiety    Allergies    fragrance (Unknown)  No Known Drug Allergies  Pollen, animal hair (Unknown)    Intolerances    Milk (Unknown)    Vital Signs Last 24 Hrs  T(C): 36.8 (06 Jun 2022 13:42), Max: 36.8 (06 Jun 2022 11:57)  T(F): 98.3 (06 Jun 2022 13:42), Max: 98.3 (06 Jun 2022 11:57)  HR: 83 (07 Jun 2022 06:07) (78 - 110)  BP: 145/66 (07 Jun 2022 06:07) (113/63 - 149/79)  BP(mean): --  RR: 18 (07 Jun 2022 06:07) (16 - 18)  SpO2: 99% (07 Jun 2022 06:07) (94% - 100%)  I&O's Summary    06 Jun 2022 07:01  -  07 Jun 2022 07:00  --------------------------------------------------------  IN: 0 mL / OUT: 1800 mL / NET: -1800 mL      Weight (kg): 117.9 (06-06 @ 11:22)    TELE:  80  PHYSICAL EXAM:  Constitutional: NAD, awake and alert, obese  HEENT: Moist Mucous Membranes, Anicteric  Pulmonary: Non-labored, breath sounds are clear diminished at bases bilaterally, No wheezing, rales or rhonchi  Cardiovascular: Regular, S1 and S2, No murmurs, rubs, gallops or clicks  Gastrointestinal: Bowel Sounds present, soft, nontender, distended   Extremities: 3+ BL LE edema, erythema, warmth and tenderness    Skin: BL LE edema, erythema, warmth and tenderness w/ active blisters   Psych:  Mood & affect appropriate for situation    LABS: All Labs Reviewed:                        12.5   8.14  )-----------( 306      ( 07 Jun 2022 06:19 )             38.3                         11.8   10.15 )-----------( 296      ( 06 Jun 2022 12:10 )             35.9     07 Jun 2022 06:19    140    |  100    |  13     ----------------------------<  117    3.0     |  32     |  0.95   06 Jun 2022 12:10    138    |  104    |  15     ----------------------------<  98     3.5     |  29     |  0.88     Ca    9.2        07 Jun 2022 06:19  Ca    9.0        06 Jun 2022 12:10    TPro  6.3    /  Alb  3.0    /  TBili  0.4    /  DBili  x      /  AST  34     /  ALT  68     /  AlkPhos  99     07 Jun 2022 06:19  TPro  5.9    /  Alb  3.0    /  TBili  0.3    /  DBili  x      /  AST  39     /  ALT  60     /  AlkPhos  92     06 Jun 2022 12:10      Troponin I, High Sensitivity Result: 3.9 ng/L (06-06-22 @ 12:10)      D-Dimer Assay, Quantitative: 723 ng/mL DDU (06-06-22 @ 12:11)            12 Lead ECG:   Ventricular Rate 99 BPM    Atrial Rate 99 BPM    P-R Interval 134 ms    QRS Duration 84 ms    Q-T Interval 332 ms    QTC Calculation(Bazett) 426 ms    P Axis 40 degrees    R Axis 0 degrees    T Axis 29 degrees    Diagnosis Line Normal sinus rhythm  Cannot rule out Anterior infarct , age undetermined  Confirmed by GIN ESPITIA ([id number]) on 6/6/2022 1:13:42 PM (06-06-22 @ 11:41)    < from: Xray Chest 1 View- PORTABLE-Urgent (06.06.22 @ 13:20) >    ACC: [id number] EXAM:  XR CHEST PORTABLE URGENT 1V                          PROCEDURE DATE:  06/06/2022          INTERPRETATION:  AP supine chest on June 6, 2022 at 1:26 PM. Patient has   chest pain.    Shallow inspiration crowds the chest. Heart magnified by technique.    The aorta is prominent and possibly aneurysmal. This may be increased   from August 20, 2011.    Lungs remain grossly clear.    IMPRESSION: Prominent aorta possibly increased from prior. Consider CT   angiogram of the aorta. A note was posted on the [facility] ED discrepancy   site at 1:22 PM.    --- End of Report ---            SAM PRAJAPATI MD; Attending Radiologist  This document has been electronically signed. Jun 6 2022  1:23PM    < end of copied text >

## 2022-06-07 NOTE — PROGRESS NOTE ADULT - NS ATTEND AMEND GEN_ALL_CORE FT
edema and dyspnea, seems unlikely to be cardiac  normal bnp  echo prelim normal ef on limited study  treat as cellulitis

## 2022-06-07 NOTE — PHYSICAL THERAPY INITIAL EVALUATION ADULT - PERTINENT HX OF CURRENT PROBLEM, REHAB EVAL
57 yr old female admitted to ED for B LE Cellulitis. Doppler negative for DVT. CT angio negative for PE

## 2022-06-07 NOTE — PROGRESS NOTE ADULT - ASSESSMENT
The patient is a 57-year-old woman with PMH of MM, mild MR, HTN, Anxiety, Depression, pre-diabetes, sciatica and spinal stenosis with chronic back pain who presented to the ED with increased lower extremity edema with complaints of leg pain and shortness of breath, admitted for b/l LE cellulitis.

## 2022-06-07 NOTE — PHYSICAL THERAPY INITIAL EVALUATION ADULT - GENERAL OBSERVATIONS, REHAB EVAL
Pt found NAD supine in bed. IV. Tele. A+Ox4. Pt found NAD supine in bed. IV, external catheter Tele. A+Ox4.

## 2022-06-07 NOTE — PROGRESS NOTE ADULT - PROBLEM SELECTOR PLAN 4
Follows with oncologist Dr. Mak Amin.  Patient reports taking Acyclovir and baby Aspirin, both prescribed by her oncologist, will resume both.

## 2022-06-08 DIAGNOSIS — R00.0 TACHYCARDIA, UNSPECIFIED: ICD-10-CM

## 2022-06-08 LAB
ANION GAP SERPL CALC-SCNC: 6 MMOL/L — SIGNIFICANT CHANGE UP (ref 5–17)
BUN SERPL-MCNC: 13 MG/DL — SIGNIFICANT CHANGE UP (ref 7–23)
CALCIUM SERPL-MCNC: 9.4 MG/DL — SIGNIFICANT CHANGE UP (ref 8.5–10.1)
CHLORIDE SERPL-SCNC: 100 MMOL/L — SIGNIFICANT CHANGE UP (ref 96–108)
CO2 SERPL-SCNC: 33 MMOL/L — HIGH (ref 22–31)
CREAT SERPL-MCNC: 0.74 MG/DL — SIGNIFICANT CHANGE UP (ref 0.5–1.3)
EGFR: 94 ML/MIN/1.73M2 — SIGNIFICANT CHANGE UP
GLUCOSE SERPL-MCNC: 118 MG/DL — HIGH (ref 70–99)
HCT VFR BLD CALC: 36.6 % — SIGNIFICANT CHANGE UP (ref 34.5–45)
HGB BLD-MCNC: 11.9 G/DL — SIGNIFICANT CHANGE UP (ref 11.5–15.5)
MAGNESIUM SERPL-MCNC: 2.2 MG/DL — SIGNIFICANT CHANGE UP (ref 1.6–2.6)
MCHC RBC-ENTMCNC: 32.2 PG — SIGNIFICANT CHANGE UP (ref 27–34)
MCHC RBC-ENTMCNC: 32.5 GM/DL — SIGNIFICANT CHANGE UP (ref 32–36)
MCV RBC AUTO: 99.2 FL — SIGNIFICANT CHANGE UP (ref 80–100)
MRSA PCR RESULT.: SIGNIFICANT CHANGE UP
NRBC # BLD: 0 /100 WBCS — SIGNIFICANT CHANGE UP (ref 0–0)
PLATELET # BLD AUTO: 297 K/UL — SIGNIFICANT CHANGE UP (ref 150–400)
POTASSIUM SERPL-MCNC: 3.5 MMOL/L — SIGNIFICANT CHANGE UP (ref 3.5–5.3)
POTASSIUM SERPL-SCNC: 3.5 MMOL/L — SIGNIFICANT CHANGE UP (ref 3.5–5.3)
RBC # BLD: 3.69 M/UL — LOW (ref 3.8–5.2)
RBC # FLD: 13.2 % — SIGNIFICANT CHANGE UP (ref 10.3–14.5)
S AUREUS DNA NOSE QL NAA+PROBE: DETECTED
SODIUM SERPL-SCNC: 139 MMOL/L — SIGNIFICANT CHANGE UP (ref 135–145)
WBC # BLD: 10.56 K/UL — HIGH (ref 3.8–10.5)
WBC # FLD AUTO: 10.56 K/UL — HIGH (ref 3.8–10.5)

## 2022-06-08 PROCEDURE — 99232 SBSQ HOSP IP/OBS MODERATE 35: CPT

## 2022-06-08 PROCEDURE — 99233 SBSQ HOSP IP/OBS HIGH 50: CPT

## 2022-06-08 RX ORDER — POTASSIUM CHLORIDE 20 MEQ
20 PACKET (EA) ORAL
Refills: 0 | Status: COMPLETED | OUTPATIENT
Start: 2022-06-08 | End: 2022-06-08

## 2022-06-08 RX ORDER — BACLOFEN 100 %
20 POWDER (GRAM) MISCELLANEOUS EVERY 12 HOURS
Refills: 0 | Status: DISCONTINUED | OUTPATIENT
Start: 2022-06-08 | End: 2022-06-12

## 2022-06-08 RX ORDER — FUROSEMIDE 40 MG
40 TABLET ORAL DAILY
Refills: 0 | Status: DISCONTINUED | OUTPATIENT
Start: 2022-06-09 | End: 2022-06-12

## 2022-06-08 RX ADMIN — POLYETHYLENE GLYCOL 3350 17 GRAM(S): 17 POWDER, FOR SOLUTION ORAL at 11:27

## 2022-06-08 RX ADMIN — Medication 20 MILLIEQUIVALENT(S): at 15:55

## 2022-06-08 RX ADMIN — CEFTRIAXONE 100 MILLIGRAM(S): 500 INJECTION, POWDER, FOR SOLUTION INTRAMUSCULAR; INTRAVENOUS at 11:23

## 2022-06-08 RX ADMIN — ENOXAPARIN SODIUM 40 MILLIGRAM(S): 100 INJECTION SUBCUTANEOUS at 06:31

## 2022-06-08 RX ADMIN — Medication 100 MILLIGRAM(S): at 17:41

## 2022-06-08 RX ADMIN — BUPROPION HYDROCHLORIDE 150 MILLIGRAM(S): 150 TABLET, EXTENDED RELEASE ORAL at 11:26

## 2022-06-08 RX ADMIN — Medication 975 MILLIGRAM(S): at 16:51

## 2022-06-08 RX ADMIN — Medication 200 MILLIGRAM(S): at 06:31

## 2022-06-08 RX ADMIN — Medication 0.5 MILLIGRAM(S): at 06:32

## 2022-06-08 RX ADMIN — Medication 975 MILLIGRAM(S): at 15:55

## 2022-06-08 RX ADMIN — Medication 20 MILLIEQUIVALENT(S): at 11:30

## 2022-06-08 RX ADMIN — Medication 200 MILLIGRAM(S): at 17:41

## 2022-06-08 RX ADMIN — MONTELUKAST 10 MILLIGRAM(S): 4 TABLET, CHEWABLE ORAL at 21:32

## 2022-06-08 RX ADMIN — ATORVASTATIN CALCIUM 20 MILLIGRAM(S): 80 TABLET, FILM COATED ORAL at 21:25

## 2022-06-08 RX ADMIN — Medication 81 MILLIGRAM(S): at 11:27

## 2022-06-08 RX ADMIN — Medication 20 MILLIEQUIVALENT(S): at 13:37

## 2022-06-08 RX ADMIN — ENOXAPARIN SODIUM 40 MILLIGRAM(S): 100 INJECTION SUBCUTANEOUS at 17:41

## 2022-06-08 RX ADMIN — GABAPENTIN 300 MILLIGRAM(S): 400 CAPSULE ORAL at 21:24

## 2022-06-08 NOTE — PROGRESS NOTE ADULT - ASSESSMENT
56 yo female with PMHx MM, DD, HLD, anxiety/depression, sciatica, pre-diabetes, HTN, chronic back pain, mild mental retardation presenting to the ED with increased lower extremity edema. Pt states she was walking outside with her friend early Saturday AM when her friend fell and "took her with her", causing the pt to fall on gravel Pt will be enrolling in a group home in July/August as she cannot manage her own care.    RECOMMENDATIONS  1-LLE Cellulitis- may be due to fall, then superinfected and now with infection, discussed with Dr Morataya and recommend  -continue CEFTRIAXONE (started 6/7)   -ordered MRSA and will add doxycycline for now  -continue wound care as directed by wound care consult    2-Multiple myeloma- agree with continued suppressive acyclovir, not sure if pt is on Bortezomib or other therapeutic that might trigger Associated Herpes Zoster in Patients with Multiple Myeloma    other issues will coordinate with medicine    We will follow along in the care of this patient. Please call us at [phone] or text me directly on my cell# at [phone] with any concerns.

## 2022-06-08 NOTE — PROGRESS NOTE ADULT - SUBJECTIVE AND OBJECTIVE BOX
[facility] -- Nico Ugarte, Tanvi Edmonds, Asher Salas Savella, Goodger: Office # [phone]    Follow Up:  SOB, LE edema     Subjective/Observations: Patient seen and examined. Patient awake, alert, resting in bed. No complaints of chest pain, dyspnea, palpitations or dizziness. No signs of orthopnea or PND. Continues with LE edema.     REVIEW OF SYSTEMS: All other review of systems are negative unless indicated above    PAST MEDICAL & SURGICAL HISTORY:  Multiple myeloma      Mild developmental delay      HTN (hypertension)      High cholesterol      Sciatica      Anxiety      Pre-diabetes      S/P removal of ovarian cyst      S/P removal of ovarian cyst      H/O basal cell carcinoma excision    MEDICATIONS  (STANDING):  acyclovir   Oral Tab/Cap 200 milliGRAM(s) Oral two times a day  aspirin enteric coated 81 milliGRAM(s) Oral daily  atorvastatin 20 milliGRAM(s) Oral at bedtime  buPROPion XL (24-Hour) . 150 milliGRAM(s) Oral daily  cefTRIAXone   IVPB 1000 milliGRAM(s) IV Intermittent every 24 hours  enoxaparin Injectable 40 milliGRAM(s) SubCutaneous every 12 hours  furosemide   Injectable 40 milliGRAM(s) IV Push daily  gabapentin 300 milliGRAM(s) Oral at bedtime  montelukast 10 milliGRAM(s) Oral at bedtime  polyethylene glycol 3350 17 Gram(s) Oral daily  potassium chloride   Powder 20 milliEquivalent(s) Oral every 2 hours  senna 2 Tablet(s) Oral at bedtime    MEDICATIONS  (PRN):  acetaminophen     Tablet .. 975 milliGRAM(s) Oral every 8 hours PRN Moderate Pain (4 - 6)  LORazepam     Tablet 0.5 milliGRAM(s) Oral daily PRN Anxiety    Allergies    fragrance (Unknown)  No Known Drug Allergies  Pollen, animal hair (Unknown)    Intolerances    Milk (Unknown)    Vital Signs Last 24 Hrs  T(C): 37.1 (08 Jun 2022 09:17), Max: 37.3 (08 Jun 2022 04:53)  T(F): 98.8 (08 Jun 2022 09:17), Max: 99.1 (08 Jun 2022 04:53)  HR: 96 (08 Jun 2022 04:53) (92 - 107)  BP: 102/64 (08 Jun 2022 04:53) (102/64 - 130/55)  BP(mean): --  RR: 18 (08 Jun 2022 04:53) (16 - 18)  SpO2: 93% (08 Jun 2022 04:53) (93% - 99%)  I&O's Summary    07 Jun 2022 07:01  -  08 Jun 2022 07:00  --------------------------------------------------------  IN: 0 mL / OUT: 350 mL / NET: -350 mL      TELE: SR 90s   PHYSICAL EXAM:  Constitutional: NAD, awake and alert, Obese   HEENT: Moist Mucous Membranes, Anicteric  Pulmonary: Non-labored, breath sounds are clear bilaterally, Diminished at bases No wheezing, rales or rhonchi  Cardiovascular: Regular, S1 and S2, No murmurs, No rubs, gallops or clicks  Gastrointestinal:  soft, nontender, nondistended   Lymph: 1-3+  peripheral edema. No lymphadenopathy.   Skin:  BL LE edema, erythema, warmth and tenderness w/ active blisters   Psych:  Mood & affect appropriate      LABS: All Labs Reviewed:                        11.9   10.56 )-----------( 297      ( 08 Jun 2022 07:19 )             36.6                         12.5   8.14  )-----------( 306      ( 07 Jun 2022 06:19 )             38.3                         11.8   10.15 )-----------( 296      ( 06 Jun 2022 12:10 )             35.9     08 Jun 2022 07:19    139    |  100    |  13     ----------------------------<  118    3.5     |  33     |  0.74   07 Jun 2022 06:19    140    |  100    |  13     ----------------------------<  117    3.0     |  32     |  0.95   06 Jun 2022 12:10    138    |  104    |  15     ----------------------------<  98     3.5     |  29     |  0.88     Ca    9.4        08 Jun 2022 07:19  Ca    9.2        07 Jun 2022 06:19  Ca    9.0        06 Jun 2022 12:10  Mg     2.2       08 Jun 2022 07:19    TPro  6.3    /  Alb  3.0    /  TBili  0.4    /  DBili  x      /  AST  34     /  ALT  68     /  AlkPhos  99     07 Jun 2022 06:19  TPro  5.9    /  Alb  3.0    /  TBili  0.3    /  DBili  x      /  AST  39     /  ALT  60     /  AlkPhos  92     06 Jun 2022 12:10   LIVER FUNCTIONS - ( 07 Jun 2022 06:19 )  Alb: 3.0 g/dL / Pro: 6.3 g/dL / ALK PHOS: 99 U/L / ALT: 68 U/L / AST: 34 U/L / GGT: x           Troponin I, High Sensitivity Result: 3.9 ng/L (06-06-22 @ 12:10)  D-Dimer Assay, Quantitative: 723 ng/mL DDU (06-06-22 @ 12:11)    12 Lead ECG:   Ventricular Rate 99 BPM    Atrial Rate 99 BPM    P-R Interval 134 ms    QRS Duration 84 ms    Q-T Interval 332 ms    QTC Calculation(Bazett) 426 ms    P Axis 40 degrees    R Axis 0 degrees    T Axis 29 degrees    Diagnosis Line Normal sinus rhythm  Cannot rule out Anterior infarct , age undetermined  Confirmed by GIN ASLAS ([id number]) on 6/6/2022 1:13:42 PM (06-06-22 @ 11:41)      ACC: [id number] EXAM:  ECHO TTE WO CON COMP W DOPP                          PROCEDURE DATE:  06/06/2022          INTERPRETATION:  INDICATION: Edema  Sonographer AS    Blood Pressure 149/79    Height 160 cm     Weight 117.9 kg       BSA 2.16   sq m    Dimensions:  LA 2.5       Normal Values: 2.0 - 4.0 cm  Ao 2.5        Normal Values: 2.0 - 3.8 cm  SEPTUM 1.1       Normal Values: 0.6 - 1.2 cm  PWT 1.0       Normal Values: 0.6 - 1.1 cm  LVIDd 4.4         Normal Values: 3.0 - 5.6 cm  LVIDs 3.0 Normal Values: 1.8 - 4.0 cm      OBSERVATIONS:  Technically difficult and limited study  Mitral Valve: normal, trace physiologic MR.  Aortic Valve/Aorta: Aortic valve is not well-visualized  Tricuspid Valve: normal with trace TR.  Pulmonic Valve: Not well-visualized  Left Atrium: normal  Right Atrium: Not well-visualized  Left Ventricle: The left ventricular endocardium is not well-visualized.   Grossly normal LV size and systolic function, estimated LVEF of 55-60%.   Cannot rule out segmental abnormalities  Right Ventricle: Grossly normal size and systolic function.  Pericardium: no significant pericardial effusion.  IVC measures 1.1 cm      IMPRESSION:  Technically difficult and limited study  The left ventricular endocardium is not well-visualized. Grossly normal   LV size and systolic function, estimated LVEF of 55-60%. Cannot rule out   segmental abnormalities  Grossly normal RV size and systolic function.  The aortic valve is not well-visualized  Trace physiologic MR and TR.  --- End of Report ---  DONOVAN BROWNING MD; Attending Cardiologist  This document has been electronically signed. Jun 7 2022  2:16PM

## 2022-06-08 NOTE — DIETITIAN INITIAL EVALUATION ADULT - NSICDXPASTSURGICALHX_GEN_ALL_CORE_FT
PAST SURGICAL HISTORY:  H/O basal cell carcinoma excision     S/P removal of ovarian cyst     S/P removal of ovarian cyst

## 2022-06-08 NOTE — PROGRESS NOTE ADULT - SUBJECTIVE AND OBJECTIVE BOX
[facility] DIVISION of INFECTIOUS DISEASE  Kiran Jorgensen MD PhD, Amber Stewart MD, Lizbeth Delgado MD, Jennifer Sterling MD, Yeison Rizo MD  and providing coverage with Yousif Solis MD  Providing Infectious Disease Consultations at [facility], [facility], [facility]'s    Office# [phone] to schedule follow up appointments  Answering Service for urgent calls or New Consults [phone]  Cell# to text for urgent issues Kiran Jorgensen [phone]     infectious diseases progress note:    HOANG MICHEL is a 57y y. o. Female patient    No concerning overnight events    Allergies    fragrance (Unknown)  No Known Drug Allergies  Pollen, animal hair (Unknown)    Intolerances    Milk (Unknown)      ANTIBIOTICS/RELEVANT:  antimicrobials  acyclovir   Oral Tab/Cap 200 milliGRAM(s) Oral two times a day  cefTRIAXone   IVPB 1000 milliGRAM(s) IV Intermittent every 24 hours    immunologic:    OTHER:  acetaminophen     Tablet .. 975 milliGRAM(s) Oral every 8 hours PRN  aspirin enteric coated 81 milliGRAM(s) Oral daily  atorvastatin 20 milliGRAM(s) Oral at bedtime  buPROPion XL (24-Hour) . 150 milliGRAM(s) Oral daily  enoxaparin Injectable 40 milliGRAM(s) SubCutaneous every 12 hours  furosemide   Injectable 40 milliGRAM(s) IV Push daily  gabapentin 300 milliGRAM(s) Oral at bedtime  LORazepam     Tablet 0.5 milliGRAM(s) Oral daily PRN  montelukast 10 milliGRAM(s) Oral at bedtime  polyethylene glycol 3350 17 Gram(s) Oral daily  potassium chloride   Powder 20 milliEquivalent(s) Oral every 2 hours  senna 2 Tablet(s) Oral at bedtime      Objective:  Vital Signs Last 24 Hrs  T(C): 36.7 (08 Jun 2022 12:11), Max: 37.3 (08 Jun 2022 04:53)  T(F): 98 (08 Jun 2022 12:11), Max: 99.1 (08 Jun 2022 04:53)  HR: 112 (08 Jun 2022 12:11) (92 - 112)  BP: 120/74 (08 Jun 2022 12:11) (102/64 - 130/55)  BP(mean): --  RR: 18 (08 Jun 2022 12:11) (18 - 18)  SpO2: 95% (08 Jun 2022 12:11) (93% - 96%)    T(C): 36.7 (06-08-22 @ 12:11), Max: 37.3 (06-08-22 @ 04:53)  T(C): 36.7 (06-08-22 @ 12:11), Max: 37.3 (06-08-22 @ 04:53)  T(C): 36.7 (06-08-22 @ 12:11), Max: 37.3 (06-08-22 @ 04:53)    PHYSICAL EXAM:  HEENT: NC atraumatic  Neck: supple  Respiratory: no accessory muscle use, breathing comfortably  Cardiovascular: distant  Gastrointestinal: normal appearing, nondistended  Extremities: no clubbing, no cyanosis, multiple purulent excoriated lesions on LEs with surrounding erythema and erythema both LEs        LABS:                          11.9   10.56 )-----------( 297      ( 08 Jun 2022 07:19 )             36.6       WBC  10.56 06-08 @ 07:19  8.14 06-07 @ 06:19  10.15 06-06 @ 12:10      06-08    139  |  100  |  13  ----------------------------<  118<H>  3.5   |  33<H>  |  0.74    Ca    9.4      08 Jun 2022 07:19  Mg     2.2     06-08    TPro  6.3  /  Alb  3.0<L>  /  TBili  0.4  /  DBili  x   /  AST  34  /  ALT  68  /  AlkPhos  99  06-07      Creatinine, Serum: 0.74 mg/dL (06-08-22 @ 07:19)  Creatinine, Serum: 0.95 mg/dL (06-07-22 @ 06:19)  Creatinine, Serum: 0.88 mg/dL (06-06-22 @ 12:10)                INFLAMMATORY MARKERS  Auto Neutrophil #: 5.78 K/uL (06-07-22 @ 06:19)  Auto Lymphocyte #: 1.09 K/uL (06-07-22 @ 06:19)  Auto Neutrophil #: 8.44 K/uL (06-06-22 @ 12:10)  Auto Lymphocyte #: 0.49 K/uL (06-06-22 @ 12:10)      Auto Eosinophil #: 0.14 K/uL (06-07-22 @ 06:19)  Auto Eosinophil #: 0.05 K/uL (06-06-22 @ 12:10)                          D-Dimer Assay, Quantitative: 723 ng/mL DDU (06-06-22 @ 12:11)        MICROBIOLOGY:              RADIOLOGY & ADDITIONAL STUDIES:

## 2022-06-08 NOTE — DIETITIAN INITIAL EVALUATION ADULT - ORAL INTAKE PTA/DIET HISTORY
Pt lives alone- has smoothies, cereal, frozen dinners, frozen juice bars or frozen yogurt pops, gets delivery from [employer].  Endorses uncontrolled eating of some foods.  Admits to wt gain past few months (estimates ~30# but not really sure).

## 2022-06-08 NOTE — DIETITIAN NUTRITION RISK NOTIFICATION - TREATMENT: THE FOLLOWING DIET HAS BEEN RECOMMENDED
Diet, DASH/TLC:   Sodium & Cholesterol Restricted  Consistent Carbohydrate {Evening Snack} (06-07-22 @ 11:15) [Active]

## 2022-06-08 NOTE — PROGRESS NOTE ADULT - PROBLEM SELECTOR PLAN 4
Follows with oncologist Dr. Mak Amin.  Patient reports taking Acyclovir and baby Aspirin, both prescribed by her oncologist, will resume both. Mild sinus tachycardia.  May be related to anxiety or pain but both seem to be controlled with no reported pain at rest.  May be related to infection.  ID follow-up as above.  May be secondary to diuresis.  Await cardiology follow-up.

## 2022-06-08 NOTE — PROGRESS NOTE ADULT - PROBLEM SELECTOR PLAN 10
Lovenox 40 SC BID. A1C 6.6 diagnostic for DM.  Consistent carb diet.  Finger stick before meals and at bedtime.  Add sliding scale if finger sticks >200.  Nutrition consult.  SW/CM also consulted regarding living situation, possible placement.

## 2022-06-08 NOTE — DIETITIAN INITIAL EVALUATION ADULT - EDUCATION DIETARY MODIFICATIONS
discussed portion control, low calorie snacks, meal options, exercise/(1) partially meets; needs review/practice

## 2022-06-08 NOTE — PROGRESS NOTE ADULT - PROBLEM SELECTOR PLAN 1
Leg erythema still present, and right leg erythema seems a little more pronounced this morning.  Concern for bilateral leg cellulitis.Mild leg erythema on exam, L>R.  No fever or leukocytosis.  ID input appreciated and case d/w Dr. Jorgensen.  Suspect left leg cellulitis.  Changing antibiotics to Ceftriaxone.  Follow-up pending blood cultures.  Wound care consult. Leg erythema still present, and right leg erythema seems a little more pronounced this morning.  Concern for bilateral leg cellulitis in setting of underlying leg edema and new diagnosis of DM with recent fall.  Continue Ceftriaxone.  ID follow-up.  Follow-up pending blood cultures.  Wound care consult appreciated.

## 2022-06-08 NOTE — PROGRESS NOTE ADULT - PROBLEM SELECTOR PLAN 9
A1C 6.6 diagnostic for DM.  Will change to consistent carb diet.  Finger stick before meals and at bedtime.  Add sliding scale if finger sticks >200.  Will request nutrition consult.  SW/CM also consulted regarding living situation, possible placement. Continue home bupropion and PRN lorazepam.

## 2022-06-08 NOTE — PROGRESS NOTE ADULT - PROBLEM SELECTOR PLAN 2
Bilateral leg edema, suspect may be secondary to heart failure.  BNP 20, but may be lowered in setting of obesity.  Cardiology input appreciated.  Continue IV Lasix.  Echo ordered and pending. Bilateral leg edema, possibly worsened in setting of cellulitis, but per patient, she has had long-standing leg edema that has been treated with Lasix, but does note that the edema has worsened over time.  Echo without evidence of heart failure.  Possible underlying venous insufficiency in setting of obesity, DM.  Continue IV Lasix per cardiology.

## 2022-06-08 NOTE — PROGRESS NOTE ADULT - ASSESSMENT
56 yo female with PMH MM, DD, HTN, chronic back pain presenting to the ED with increased lower extremity edema and shortness of breath since past few days.  follows Dr. Keith Rosales (o/p card)    - Pt p/w c/o B/L LE edema and shortness of breath since past few days   - her LE edema appears cellulitic in nature  - D-dimer 723 and Pro BNP 20   - US LE shows no evidence of DVT  - CT Angio chest negative for PE  - Pt remains volume overload on exam with LE edema  - s/p IV Lasix 40mg x1, continue IV Lasix 40mg daily   - Technically difficult ECHO showed normal LV & RV size and function EF 55-60%, trace MR and TR    - EKG: NSR, no acute ischemic changes noted    - Continue aspirin and Lipitor     - BP and HR well controlled, monitor routine hemodynamics.    - Monitor and replete lytes, keep K>4, Mg>2.  - Will continue to follow.    Sole Vazquez, MS FNP, AGAP  Nurse Practitioner- Cardiology   Spectra #[phone] /[phone]

## 2022-06-08 NOTE — DIETITIAN INITIAL EVALUATION ADULT - OTHER INFO
reports difficulty getting food to mouth due to hands shaking.  Consider OT eval. Pt will likely be transferring to group home.

## 2022-06-08 NOTE — PROGRESS NOTE ADULT - PROBLEM SELECTOR PLAN 5
Not on home meds.  Continue Lasix as above.  Monitor BP.  Dash Diet. Follows with oncologist Dr. Mak Amin.  Patient reports taking Acyclovir and baby Aspirin, both prescribed by her oncologist, both being continued in hospital.

## 2022-06-08 NOTE — DIETITIAN INITIAL EVALUATION ADULT - PERTINENT MEDS FT
MEDICATIONS  (STANDING):  acyclovir   Oral Tab/Cap 200 milliGRAM(s) Oral two times a day  aspirin enteric coated 81 milliGRAM(s) Oral daily  atorvastatin 20 milliGRAM(s) Oral at bedtime  buPROPion XL (24-Hour) . 150 milliGRAM(s) Oral daily  cefTRIAXone   IVPB 1000 milliGRAM(s) IV Intermittent every 24 hours  enoxaparin Injectable 40 milliGRAM(s) SubCutaneous every 12 hours  furosemide   Injectable 40 milliGRAM(s) IV Push daily  gabapentin 300 milliGRAM(s) Oral at bedtime  montelukast 10 milliGRAM(s) Oral at bedtime  polyethylene glycol 3350 17 Gram(s) Oral daily  potassium chloride   Powder 20 milliEquivalent(s) Oral every 2 hours  senna 2 Tablet(s) Oral at bedtime    MEDICATIONS  (PRN):  acetaminophen     Tablet .. 975 milliGRAM(s) Oral every 8 hours PRN Moderate Pain (4 - 6)  LORazepam     Tablet 0.5 milliGRAM(s) Oral daily PRN Anxiety

## 2022-06-08 NOTE — DIETITIAN INITIAL EVALUATION ADULT - PERTINENT LABORATORY DATA
06-08    139  |  100  |  13  ----------------------------<  118<H>  3.5   |  33<H>  |  0.74    Ca    9.4      08 Jun 2022 07:19  Mg     2.2     06-08    TPro  6.3  /  Alb  3.0<L>  /  TBili  0.4  /  DBili  x   /  AST  34  /  ALT  68  /  AlkPhos  99  06-07  POCT Blood Glucose.: 123 mg/dL (06-08-22 @ 07:45)  A1C with Estimated Average Glucose Result: 6.6 % (06-07-22 @ 08:58)

## 2022-06-08 NOTE — DIETITIAN INITIAL EVALUATION ADULT - NSICDXPASTMEDICALHX_GEN_ALL_CORE_FT
PAST MEDICAL HISTORY:  Anxiety     High cholesterol     HTN (hypertension)     Mild developmental delay     Multiple myeloma     Pre-diabetes     Sciatica

## 2022-06-08 NOTE — PROGRESS NOTE ADULT - SUBJECTIVE AND OBJECTIVE BOX
Duy Jacobs MD  [phone]    SUBJECTIVE:  Resting in bed.  Leg pain improved from yesterday, still has pain with movement but no pain at rest.  No CP/SOB.  No N/V.  Reports feeling constipated.  NAD.    Tele: Sinus tach, .    MEDICATIONS  (STANDING):  acyclovir   Oral Tab/Cap 200 milliGRAM(s) Oral two times a day  aspirin enteric coated 81 milliGRAM(s) Oral daily  atorvastatin 20 milliGRAM(s) Oral at bedtime  buPROPion XL (24-Hour) . 150 milliGRAM(s) Oral daily  cefTRIAXone   IVPB 1000 milliGRAM(s) IV Intermittent every 24 hours  enoxaparin Injectable 40 milliGRAM(s) SubCutaneous every 12 hours  furosemide   Injectable 40 milliGRAM(s) IV Push daily  gabapentin 300 milliGRAM(s) Oral at bedtime  montelukast 10 milliGRAM(s) Oral at bedtime  polyethylene glycol 3350 17 Gram(s) Oral daily  senna 2 Tablet(s) Oral at bedtime    MEDICATIONS  (PRN):  acetaminophen     Tablet .. 975 milliGRAM(s) Oral every 8 hours PRN Moderate Pain (4 - 6)  LORazepam     Tablet 0.5 milliGRAM(s) Oral daily PRN Anxiety      Vital Signs Last 24 Hrs  T(C): 37.1 (08 Jun 2022 09:17), Max: 37.3 (08 Jun 2022 04:53)  T(F): 98.8 (08 Jun 2022 09:17), Max: 99.1 (08 Jun 2022 04:53)  HR: 96 (08 Jun 2022 04:53) (92 - 107)  BP: 102/64 (08 Jun 2022 04:53) (102/64 - 130/55)  BP(mean): --  RR: 18 (08 Jun 2022 04:53) (16 - 18)  SpO2: 93% (08 Jun 2022 04:53) (93% - 99%)    CAPILLARY BLOOD GLUCOSE      POCT Blood Glucose.: 123 mg/dL (08 Jun 2022 07:45)  POCT Blood Glucose.: 118 mg/dL (07 Jun 2022 20:57)  POCT Blood Glucose.: 180 mg/dL (07 Jun 2022 16:53)  POCT Blood Glucose.: 128 mg/dL (07 Jun 2022 12:34)    I&O's Summary    07 Jun 2022 07:01  -  08 Jun 2022 07:00  --------------------------------------------------------  IN: 0 mL / OUT: 350 mL / NET: -350 mL        PHYSICAL EXAM:  GENERAL: Looks stated age, NAD  CARDIOVASCULAR: Normal S1, S2  PULMONARY: Lungs clear to auscultation bilaterally. No wheezes/rales/rhonchi  GI: Abdomen non-distended, soft, Nontender.  Bowel sounds present  MSK/Ext/Derm:  1+ leg edema b/l.  Small blisters, some with skin breakdown on bilateral legs with focal erythema surrounding the blisters.  Bilateral legs also with erythema, most pronounced in the lower medial/anterior legs b/l with warmth to touch, R>L.  PSYCH: Normal Affect. AAOx3      LABS:                        11.9   10.56 )-----------( 297      ( 08 Jun 2022 07:19 )             36.6     06-08    139  |  100  |  13  ----------------------------<  118<H>  3.5   |  33<H>  |  0.74    Ca    9.4      08 Jun 2022 07:19  Mg     2.2     06-08    TPro  6.3  /  Alb  3.0<L>  /  TBili  0.4  /  DBili  x   /  AST  34  /  ALT  68  /  AlkPhos  99  06-07                RADIOLOGY & ADDITIONAL TESTS:       Duy Jacobs MD  [phone]    SUBJECTIVE:  Resting in bed.  Leg pain improved from yesterday, still has pain with movement but no pain at rest.  No CP/SOB.  No N/V.  Reports feeling constipated.  NAD.    Tele: Sinus tach, .    MEDICATIONS  (STANDING):  acyclovir   Oral Tab/Cap 200 milliGRAM(s) Oral two times a day  aspirin enteric coated 81 milliGRAM(s) Oral daily  atorvastatin 20 milliGRAM(s) Oral at bedtime  buPROPion XL (24-Hour) . 150 milliGRAM(s) Oral daily  cefTRIAXone   IVPB 1000 milliGRAM(s) IV Intermittent every 24 hours  enoxaparin Injectable 40 milliGRAM(s) SubCutaneous every 12 hours  furosemide   Injectable 40 milliGRAM(s) IV Push daily  gabapentin 300 milliGRAM(s) Oral at bedtime  montelukast 10 milliGRAM(s) Oral at bedtime  polyethylene glycol 3350 17 Gram(s) Oral daily  senna 2 Tablet(s) Oral at bedtime    MEDICATIONS  (PRN):  acetaminophen     Tablet .. 975 milliGRAM(s) Oral every 8 hours PRN Moderate Pain (4 - 6)  LORazepam     Tablet 0.5 milliGRAM(s) Oral daily PRN Anxiety      Vital Signs Last 24 Hrs  T(C): 37.1 (08 Jun 2022 09:17), Max: 37.3 (08 Jun 2022 04:53)  T(F): 98.8 (08 Jun 2022 09:17), Max: 99.1 (08 Jun 2022 04:53)  HR: 96 (08 Jun 2022 04:53) (92 - 107)  BP: 102/64 (08 Jun 2022 04:53) (102/64 - 130/55)  BP(mean): --  RR: 18 (08 Jun 2022 04:53) (16 - 18)  SpO2: 93% (08 Jun 2022 04:53) (93% - 99%)    CAPILLARY BLOOD GLUCOSE      POCT Blood Glucose.: 123 mg/dL (08 Jun 2022 07:45)  POCT Blood Glucose.: 118 mg/dL (07 Jun 2022 20:57)  POCT Blood Glucose.: 180 mg/dL (07 Jun 2022 16:53)  POCT Blood Glucose.: 128 mg/dL (07 Jun 2022 12:34)    I&O's Summary    07 Jun 2022 07:01  -  08 Jun 2022 07:00  --------------------------------------------------------  IN: 0 mL / OUT: 350 mL / NET: -350 mL        PHYSICAL EXAM:  GENERAL: Looks stated age, NAD  CARDIOVASCULAR: Normal S1, S2  PULMONARY: Lungs clear to auscultation bilaterally. No wheezes/rales/rhonchi  GI: Abdomen non-distended, soft, Nontender.  Bowel sounds present  MSK/Ext/Derm:  1+ leg edema b/l.  Small blisters, some with skin breakdown on bilateral legs with focal erythema surrounding the blisters.  Bilateral legs also with erythema, most pronounced in the lower medial/anterior legs b/l with warmth to touch, R>L.  PSYCH: Normal Affect. AAOx3      LABS:                        11.9   10.56 )-----------( 297      ( 08 Jun 2022 07:19 )             36.6     06-08    139  |  100  |  13  ----------------------------<  118<H>  3.5   |  33<H>  |  0.74    Ca    9.4      08 Jun 2022 07:19  Mg     2.2     06-08    TPro  6.3  /  Alb  3.0<L>  /  TBili  0.4  /  DBili  x   /  AST  34  /  ALT  68  /  AlkPhos  99  06-07                RADIOLOGY & ADDITIONAL TESTS:    < from: TTE Echo Complete w/o Contrast w/ Doppler (06.06.22 @ 18:08) >  IMPRESSION:  Technically difficult and limited study  The left ventricular endocardium is not well-visualized. Grossly normal   LV size and systolic function, estimated LVEF of 55-60%. Cannot rule out   segmental abnormalities  Grossly normal RV size and systolic function.  The aortic valve is not well-visualized  Trace physiologic MR and TR.    < end of copied text >

## 2022-06-08 NOTE — PROGRESS NOTE ADULT - PROBLEM SELECTOR PLAN 3
Patient with shortness of breath with progressively worsening LE edema.  D-dimer elevated to 723.  LE dopplers negative for DVT, CT Chest Angio negative for PE or aortic aneurysm with clear lungs.  Continue Lasix 40mg IV daily as above.  Await echo. Patient presented with c/o shortness of breath with progressively worsening LE edema.  LE dopplers negative for DVT, CT Chest Angio negative for PE or aortic aneurysm with clear lungs.  No hypoxia on RA, no complaints of orthopnea.  Echo without evidence of heart failure.  Continue IV Lasix per cardiology.

## 2022-06-09 LAB
ANION GAP SERPL CALC-SCNC: 8 MMOL/L — SIGNIFICANT CHANGE UP (ref 5–17)
BUN SERPL-MCNC: 12 MG/DL — SIGNIFICANT CHANGE UP (ref 7–23)
CALCIUM SERPL-MCNC: 9.4 MG/DL — SIGNIFICANT CHANGE UP (ref 8.5–10.1)
CHLORIDE SERPL-SCNC: 102 MMOL/L — SIGNIFICANT CHANGE UP (ref 96–108)
CO2 SERPL-SCNC: 29 MMOL/L — SIGNIFICANT CHANGE UP (ref 22–31)
CREAT SERPL-MCNC: 0.62 MG/DL — SIGNIFICANT CHANGE UP (ref 0.5–1.3)
EGFR: 104 ML/MIN/1.73M2 — SIGNIFICANT CHANGE UP
GLUCOSE SERPL-MCNC: 108 MG/DL — HIGH (ref 70–99)
HCT VFR BLD CALC: 35.9 % — SIGNIFICANT CHANGE UP (ref 34.5–45)
HGB BLD-MCNC: 11.7 G/DL — SIGNIFICANT CHANGE UP (ref 11.5–15.5)
MCHC RBC-ENTMCNC: 32.3 PG — SIGNIFICANT CHANGE UP (ref 27–34)
MCHC RBC-ENTMCNC: 32.6 GM/DL — SIGNIFICANT CHANGE UP (ref 32–36)
MCV RBC AUTO: 99.2 FL — SIGNIFICANT CHANGE UP (ref 80–100)
NRBC # BLD: 0 /100 WBCS — SIGNIFICANT CHANGE UP (ref 0–0)
PLATELET # BLD AUTO: 317 K/UL — SIGNIFICANT CHANGE UP (ref 150–400)
POTASSIUM SERPL-MCNC: 3.5 MMOL/L — SIGNIFICANT CHANGE UP (ref 3.5–5.3)
POTASSIUM SERPL-SCNC: 3.5 MMOL/L — SIGNIFICANT CHANGE UP (ref 3.5–5.3)
RBC # BLD: 3.62 M/UL — LOW (ref 3.8–5.2)
RBC # FLD: 13.3 % — SIGNIFICANT CHANGE UP (ref 10.3–14.5)
SODIUM SERPL-SCNC: 139 MMOL/L — SIGNIFICANT CHANGE UP (ref 135–145)
WBC # BLD: 11.72 K/UL — HIGH (ref 3.8–10.5)
WBC # FLD AUTO: 11.72 K/UL — HIGH (ref 3.8–10.5)

## 2022-06-09 PROCEDURE — 99233 SBSQ HOSP IP/OBS HIGH 50: CPT

## 2022-06-09 PROCEDURE — 99232 SBSQ HOSP IP/OBS MODERATE 35: CPT

## 2022-06-09 RX ORDER — POTASSIUM CHLORIDE 20 MEQ
20 PACKET (EA) ORAL
Refills: 0 | Status: COMPLETED | OUTPATIENT
Start: 2022-06-09 | End: 2022-06-09

## 2022-06-09 RX ADMIN — ENOXAPARIN SODIUM 40 MILLIGRAM(S): 100 INJECTION SUBCUTANEOUS at 17:12

## 2022-06-09 RX ADMIN — Medication 20 MILLIEQUIVALENT(S): at 15:08

## 2022-06-09 RX ADMIN — CEFTRIAXONE 100 MILLIGRAM(S): 500 INJECTION, POWDER, FOR SOLUTION INTRAMUSCULAR; INTRAVENOUS at 11:29

## 2022-06-09 RX ADMIN — GABAPENTIN 300 MILLIGRAM(S): 400 CAPSULE ORAL at 21:38

## 2022-06-09 RX ADMIN — Medication 100 MILLIGRAM(S): at 06:00

## 2022-06-09 RX ADMIN — MONTELUKAST 10 MILLIGRAM(S): 4 TABLET, CHEWABLE ORAL at 21:39

## 2022-06-09 RX ADMIN — Medication 975 MILLIGRAM(S): at 19:32

## 2022-06-09 RX ADMIN — Medication 200 MILLIGRAM(S): at 17:11

## 2022-06-09 RX ADMIN — ENOXAPARIN SODIUM 40 MILLIGRAM(S): 100 INJECTION SUBCUTANEOUS at 05:59

## 2022-06-09 RX ADMIN — Medication 975 MILLIGRAM(S): at 20:02

## 2022-06-09 RX ADMIN — ATORVASTATIN CALCIUM 20 MILLIGRAM(S): 80 TABLET, FILM COATED ORAL at 21:39

## 2022-06-09 RX ADMIN — SENNA PLUS 2 TABLET(S): 8.6 TABLET ORAL at 21:38

## 2022-06-09 RX ADMIN — Medication 20 MILLIEQUIVALENT(S): at 11:30

## 2022-06-09 RX ADMIN — Medication 40 MILLIGRAM(S): at 06:00

## 2022-06-09 RX ADMIN — Medication 20 MILLIEQUIVALENT(S): at 12:48

## 2022-06-09 RX ADMIN — Medication 81 MILLIGRAM(S): at 11:33

## 2022-06-09 RX ADMIN — Medication 20 MILLIEQUIVALENT(S): at 17:11

## 2022-06-09 RX ADMIN — Medication 0.5 MILLIGRAM(S): at 15:12

## 2022-06-09 RX ADMIN — BUPROPION HYDROCHLORIDE 150 MILLIGRAM(S): 150 TABLET, EXTENDED RELEASE ORAL at 11:29

## 2022-06-09 RX ADMIN — Medication 200 MILLIGRAM(S): at 06:00

## 2022-06-09 NOTE — PROGRESS NOTE ADULT - ASSESSMENT
58 yo female with PMHx MM, DD, HLD, anxiety/depression, sciatica, pre-diabetes, HTN, chronic back pain, mild mental retardation presenting to the ED with increased lower extremity edema. Pt states she was walking outside with her friend early Saturday AM when her friend fell and "took her with her", causing the pt to fall on gravel Pt will be enrolling in a group home in July/August as she cannot manage her own care.    RECOMMENDATIONS  1-LLE Cellulitis- may be due to fall, then superinfected and now with infection, discussed with Dr Morataya and recommend  -continue CEFTRIAXONE (started 6/7)   -neg MRSA so stopped doxycycline    -if continues to improve can look at DC on Cefuroxime 500mg PO BID with last day 6/13  -continue wound care as directed by wound care consult    2-Multiple myeloma- agree with continued suppressive acyclovir, not sure if pt is on Bortezomib or other therapeutic that might trigger Associated Herpes Zoster in Patients with Multiple Myeloma, fine with steroids as part of Rx    other issues will coordinate with medicine    We will follow along in the care of this patient. Please call us at [phone] or text me directly on my cell# at [phone] with any concerns.

## 2022-06-09 NOTE — PROGRESS NOTE ADULT - SUBJECTIVE AND OBJECTIVE BOX
[facility] -- Nico Ugarte Grossman, Wachsman, Asher Salas Savella, Goodger: Office # [phone]    Follow Up:   SOB, LE edema    Subjective/Observations: Patient seen and examined, awake, alert, resting comfortably in bed, denies chest pain, dyspnea, palpitations or dizziness, orthopnea and PND. Tolerating room air    REVIEW OF SYSTEMS: All review of systems is negative for eye, ENT, GI, , allergic, dermatologic, musculoskeletal and neurologic except as described above    PAST MEDICAL & SURGICAL HISTORY:  Multiple myeloma      Mild developmental delay      HTN (hypertension)      High cholesterol      Sciatica      Anxiety      Pre-diabetes      S/P removal of ovarian cyst      S/P removal of ovarian cyst      H/O basal cell carcinoma excision          MEDICATIONS  (STANDING):  acyclovir   Oral Tab/Cap 200 milliGRAM(s) Oral two times a day  aspirin enteric coated 81 milliGRAM(s) Oral daily  atorvastatin 20 milliGRAM(s) Oral at bedtime  buPROPion XL (24-Hour) . 150 milliGRAM(s) Oral daily  cefTRIAXone   IVPB 1000 milliGRAM(s) IV Intermittent every 24 hours  enoxaparin Injectable 40 milliGRAM(s) SubCutaneous every 12 hours  furosemide    Tablet 40 milliGRAM(s) Oral daily  gabapentin 300 milliGRAM(s) Oral at bedtime  montelukast 10 milliGRAM(s) Oral at bedtime  polyethylene glycol 3350 17 Gram(s) Oral daily  potassium chloride   Powder 20 milliEquivalent(s) Oral every 2 hours  senna 2 Tablet(s) Oral at bedtime    MEDICATIONS  (PRN):  acetaminophen     Tablet .. 975 milliGRAM(s) Oral every 8 hours PRN Moderate Pain (4 - 6)  baclofen 20 milliGRAM(s) Oral every 12 hours PRN Muscle Spasm  LORazepam     Tablet 0.5 milliGRAM(s) Oral daily PRN Anxiety    Allergies    fragrance (Unknown)  No Known Drug Allergies  Pollen, animal hair (Unknown)    Intolerances    Milk (Unknown)    Vital Signs Last 24 Hrs  T(C): 36.7 (09 Jun 2022 05:08), Max: 36.9 (08 Jun 2022 20:35)  T(F): 98.1 (09 Jun 2022 05:08), Max: 98.4 (08 Jun 2022 20:35)  HR: 90 (09 Jun 2022 05:08) (90 - 112)  BP: 137/85 (09 Jun 2022 05:08) (118/78 - 137/85)  BP(mean): --  RR: 18 (09 Jun 2022 05:08) (18 - 18)  SpO2: 94% (09 Jun 2022 05:08) (93% - 95%)  I&O's Summary    08 Jun 2022 07:01  -  09 Jun 2022 07:00  --------------------------------------------------------  IN: 50 mL / OUT: 0 mL / NET: 50 mL          TELE:  92  PHYSICAL EXAM:  Constitutional: NAD, awake and alert, Obese  HEENT: Moist Mucous Membranes, Anicteric  Pulmonary: Non-labored, breath sounds are clear bilaterally, diminished at bases No wheezing, rales or rhonchi  Cardiovascular: Regular, S1 and S2, No murmurs, rubs, gallops or clicks  Gastrointestinal: Bowel Sounds present, soft, nontender.   Lymph: 1-3+ peripheral edema. No lymphadenopathy.  Skin: BL LE edema, erythema, warmth and tenderness w/ active blisters   Psych:  Mood & affect appropriate for situation    LABS: All Labs Reviewed:                        11.7   11.72 )-----------( 317      ( 09 Jun 2022 08:19 )             35.9                         11.9   10.56 )-----------( 297      ( 08 Jun 2022 07:19 )             36.6                         12.5   8.14  )-----------( 306      ( 07 Jun 2022 06:19 )             38.3     09 Jun 2022 08:19    139    |  102    |  12     ----------------------------<  108    3.5     |  29     |  0.62   08 Jun 2022 07:19    139    |  100    |  13     ----------------------------<  118    3.5     |  33     |  0.74   07 Jun 2022 06:19    140    |  100    |  13     ----------------------------<  117    3.0     |  32     |  0.95     Ca    9.4        09 Jun 2022 08:19  Ca    9.4        08 Jun 2022 07:19  Ca    9.2        07 Jun 2022 06:19  Mg     2.2       08 Jun 2022 07:19    TPro  6.3    /  Alb  3.0    /  TBili  0.4    /  DBili  x      /  AST  34     /  ALT  68     /  AlkPhos  99     07 Jun 2022 06:19  TPro  5.9    /  Alb  3.0    /  TBili  0.3    /  DBili  x      /  AST  39     /  ALT  60     /  AlkPhos  92     06 Jun 2022 12:10      Troponin I, High Sensitivity Result: 3.9 ng/L (06-06-22 @ 12:10)      D-Dimer Assay, Quantitative: 723 ng/mL DDU (06-06-22 @ 12:11)            12 Lead ECG:   Ventricular Rate 99 BPM    Atrial Rate 99 BPM    P-R Interval 134 ms    QRS Duration 84 ms    Q-T Interval 332 ms    QTC Calculation(Bazett) 426 ms    P Axis 40 degrees    R Axis 0 degrees    T Axis 29 degrees    Diagnosis Line Normal sinus rhythm  Cannot rule out Anterior infarct , age undetermined  Confirmed by GIN SALAS ([id number]) on 6/6/2022 1:13:42 PM (06-06-22 @ 11:41)    < from: Xray Chest 1 View- PORTABLE-Urgent (06.06.22 @ 13:20) >    ACC: [id number] EXAM:  XR CHEST PORTABLE URGENT 1V                          PROCEDURE DATE:  06/06/2022          INTERPRETATION:  AP supine chest on June 6, 2022 at 1:26 PM. Patient has   chest pain.    Shallow inspiration crowds the chest. Heart magnified by technique.    The aorta is prominent and possibly aneurysmal. This may be increased   from August 20, 2011.    Lungs remain grossly clear.    IMPRESSION: Prominent aorta possibly increased from prior. Consider CT   angiogram of the aorta. A note was posted on the [facility] ED discrepancy   site at 1:22 PM.    --- End of Report ---            SAM PRAJAPATI MD; Attending Radiologist  This document has been electronically signed. Jun 6 2022  1:23PM    < end of copied text >  < from: TTE Echo Complete w/o Contrast w/ Doppler (06.06.22 @ 18:08) >    ACC: [id number] EXAM:  ECHO TTE WO CON COMP W DOPP                          PROCEDURE DATE:  06/06/2022          INTERPRETATION:  INDICATION: Edema  Sonographer AS    Blood Pressure 149/79    Height 160 cm     Weight 117.9 kg       BSA 2.16   sq m    Dimensions:  LA 2.5       Normal Values: 2.0 - 4.0 cm  Ao 2.5        Normal Values: 2.0 - 3.8 cm  SEPTUM 1.1       Normal Values: 0.6 - 1.2 cm  PWT 1.0       Normal Values: 0.6 - 1.1 cm  LVIDd 4.4         Normal Values: 3.0 - 5.6 cm  LVIDs 3.0 Normal Values: 1.8 - 4.0 cm      OBSERVATIONS:  Technically difficult and limited study  Mitral Valve: normal, trace physiologic MR.  Aortic Valve/Aorta: Aortic valve is not well-visualized  Tricuspid Valve: normal with trace TR.  Pulmonic Valve: Not well-visualized  Left Atrium: normal  Right Atrium: Not well-visualized  Left Ventricle: The left ventricular endocardium is not well-visualized.   Grossly normal LV size and systolic function, estimated LVEF of 55-60%.   Cannot rule out segmental abnormalities  Right Ventricle: Grossly normal size and systolic function.  Pericardium: no significant pericardial effusion.  IVC measures 1.1 cm          IMPRESSION:  Technically difficult and limited study  The left ventricular endocardium is not well-visualized. Grossly normal   LV size and systolic function, estimated LVEF of 55-60%. Cannot rule out   segmental abnormalities  Grossly normal RV size and systolic function.  The aortic valve is not well-visualized  Trace physiologic MR and TR.    --- End of Report ---            DONOVAN BROWNING MD; Attending Cardiologist  This document has been electronically signed. Jun 7 2022  2:16PM    < end of copied text >

## 2022-06-09 NOTE — PROGRESS NOTE ADULT - PROBLEM SELECTOR PLAN 1
Suspect bilateral leg cellulitis in setting of underlying leg edema and new diagnosis of DM with recent fall.  Right leg erythema appears to be resolved, left leg erythema still present.  Case d/w Dr. Jorgensen, continue Ceftriaxone, d/c Doxycycline given negative MRSA PCR.  Blood cultures with no growth to date.  Wound care consult appreciated.

## 2022-06-09 NOTE — PROGRESS NOTE ADULT - PROBLEM SELECTOR PLAN 10
A1C 6.6 diagnostic for DM.  Consistent carb diet.  Finger stick before meals and at bedtime.  Add sliding scale if finger sticks >200.  Nutrition consult.  SW/CM also consulted regarding living situation, possible placement. A1C 6.6 diagnostic for DM.  Consistent carb diet.  Finger stick before meals and at bedtime.  Add sliding scale if finger sticks >200.  Nutrition consult appreciated, will order OT consult.  Patient denies any issues with swallowing, will defer SLP consult at this time.  Case d/w CM, plan for rehab at discharge with possible transition to group home from rehab.

## 2022-06-09 NOTE — PROGRESS NOTE ADULT - PROBLEM SELECTOR PLAN 2
Bilateral leg edema, possibly worsened in setting of cellulitis, but per patient, she has had long-standing leg edema that has been treated with Lasix, but does note that the edema has worsened over time.  Echo without evidence of heart failure.  Possible underlying venous insufficiency in setting of obesity, DM.  Continue Lasix, changed to PO following discussion with cardiology.

## 2022-06-09 NOTE — PROGRESS NOTE ADULT - PROBLEM SELECTOR PLAN 4
Mild sinus tachycardia.  CTA was limited, but negative for PE (and dopplers negative DVT) and patient is receiving DVT prophylaxis.  Tachycardia may be related to deconditioning, chronic back pain, possibly related to infection as well.  Tachycardia secondary to hypovolemia was also a concern and Lasix changed to PO dosing following discussion with cardiology.  Still with mild tachycardia, improved from yesterday, continue to monitor.

## 2022-06-09 NOTE — PROGRESS NOTE ADULT - ASSESSMENT
The patient is a 57-year-old woman with PMH of MM, mild MR, HTN, Anxiety, Depression, pre-diabetes, sciatica and spinal stenosis with chronic back pain who presented to the ED with increased lower extremity edema with complaints of leg pain and shortness of breath, admitted for LE cellulitis.

## 2022-06-09 NOTE — PROGRESS NOTE ADULT - SUBJECTIVE AND OBJECTIVE BOX
Duy Jacobs MD  [phone]    SUBJECTIVE:  Resting in bed.  No leg pain at rest.  On questioning, reports some back pain secondary to spinal stenosis that is a chronic issue.  No SOB on RA.  NAD.    Tele: Sinus tach, .  No events reported.    MEDICATIONS  (STANDING):  acyclovir   Oral Tab/Cap 200 milliGRAM(s) Oral two times a day  aspirin enteric coated 81 milliGRAM(s) Oral daily  atorvastatin 20 milliGRAM(s) Oral at bedtime  buPROPion XL (24-Hour) . 150 milliGRAM(s) Oral daily  cefTRIAXone   IVPB 1000 milliGRAM(s) IV Intermittent every 24 hours  doxycycline hyclate Capsule 100 milliGRAM(s) Oral every 12 hours  enoxaparin Injectable 40 milliGRAM(s) SubCutaneous every 12 hours  furosemide    Tablet 40 milliGRAM(s) Oral daily  gabapentin 300 milliGRAM(s) Oral at bedtime  montelukast 10 milliGRAM(s) Oral at bedtime  polyethylene glycol 3350 17 Gram(s) Oral daily  senna 2 Tablet(s) Oral at bedtime    MEDICATIONS  (PRN):  acetaminophen     Tablet .. 975 milliGRAM(s) Oral every 8 hours PRN Moderate Pain (4 - 6)  baclofen 20 milliGRAM(s) Oral every 12 hours PRN Muscle Spasm  LORazepam     Tablet 0.5 milliGRAM(s) Oral daily PRN Anxiety      Vital Signs Last 24 Hrs  T(C): 36.7 (09 Jun 2022 05:08), Max: 36.9 (08 Jun 2022 20:35)  T(F): 98.1 (09 Jun 2022 05:08), Max: 98.4 (08 Jun 2022 20:35)  HR: 90 (09 Jun 2022 05:08) (90 - 112)  BP: 137/85 (09 Jun 2022 05:08) (118/78 - 137/85)  BP(mean): --  RR: 18 (09 Jun 2022 05:08) (18 - 18)  SpO2: 94% (09 Jun 2022 05:08) (93% - 95%)    CAPILLARY BLOOD GLUCOSE      POCT Blood Glucose.: 132 mg/dL (09 Jun 2022 07:38)  POCT Blood Glucose.: 103 mg/dL (08 Jun 2022 21:13)  POCT Blood Glucose.: 102 mg/dL (08 Jun 2022 16:46)  POCT Blood Glucose.: 104 mg/dL (08 Jun 2022 11:44)    I&O's Summary    08 Jun 2022 07:01  -  09 Jun 2022 07:00  --------------------------------------------------------  IN: 50 mL / OUT: 0 mL / NET: 50 mL        PHYSICAL EXAM:  GENERAL: Looks stated age, NAD  CARDIOVASCULAR: Normal S1, S2  PULMONARY: Lungs clear to auscultation bilaterally. No wheezes/rales/rhonchi  GI: Abdomen non-distended, soft, Nontender.  Bowel sounds present  MSK/Ext/Derm:  1+ leg edema bilaterally.  Small blisters on both legs.  No right leg erythema.  Still with left leg erythema, most pronounced in the distal anterior/medial leg with warmth to touch.  PSYCH: Normal Affect. AAOx3    LABS:                        11.7   11.72 )-----------( 317      ( 09 Jun 2022 08:19 )             35.9     06-09    139  |  102  |  12  ----------------------------<  108<H>  3.5   |  29  |  0.62    Ca    9.4      09 Jun 2022 08:19  Mg     2.2     06-08                  RADIOLOGY & ADDITIONAL TESTS:

## 2022-06-09 NOTE — PROGRESS NOTE ADULT - PROBLEM SELECTOR PLAN 5
Follows with oncologist Dr. Mak Amin.  Patient reports taking Acyclovir and baby Aspirin, both prescribed by her oncologist, both being continued in hospital.  Patient also reports being due for dose of Decadron tomorrow.  Call placed to Dr. Amin for additional information, awaiting call back.

## 2022-06-09 NOTE — PROGRESS NOTE ADULT - SUBJECTIVE AND OBJECTIVE BOX
[facility] DIVISION of INFECTIOUS DISEASE  Kiran Jorgensen MD PhD, Amber Stewart MD, Lizbeth Delgado MD, Jennifer Sterling MD, Yeison Rizo MD  and providing coverage with Yousif Solis MD  Providing Infectious Disease Consultations at [facility], [facility], [facility]'s    Office# [phone] to schedule follow up appointments  Answering Service for urgent calls or New Consults [phone]  Cell# to text for urgent issues Kiran Jorgensen [phone]     infectious diseases progress note:    HOANG MICHEL is a 57y y. o. Female patient    No concerning overnight events    Allergies    fragrance (Unknown)  No Known Drug Allergies  Pollen, animal hair (Unknown)    Intolerances    Milk (Unknown)      ANTIBIOTICS/RELEVANT:  antimicrobials  acyclovir   Oral Tab/Cap 200 milliGRAM(s) Oral two times a day  cefTRIAXone   IVPB 1000 milliGRAM(s) IV Intermittent every 24 hours    immunologic:    OTHER:  acetaminophen     Tablet .. 975 milliGRAM(s) Oral every 8 hours PRN  aspirin enteric coated 81 milliGRAM(s) Oral daily  atorvastatin 20 milliGRAM(s) Oral at bedtime  baclofen 20 milliGRAM(s) Oral every 12 hours PRN  buPROPion XL (24-Hour) . 150 milliGRAM(s) Oral daily  enoxaparin Injectable 40 milliGRAM(s) SubCutaneous every 12 hours  furosemide    Tablet 40 milliGRAM(s) Oral daily  gabapentin 300 milliGRAM(s) Oral at bedtime  LORazepam     Tablet 0.5 milliGRAM(s) Oral daily PRN  montelukast 10 milliGRAM(s) Oral at bedtime  polyethylene glycol 3350 17 Gram(s) Oral daily  potassium chloride   Powder 20 milliEquivalent(s) Oral every 2 hours  senna 2 Tablet(s) Oral at bedtime      Objective:  Vital Signs Last 24 Hrs  T(C): 36.9 (09 Jun 2022 12:08), Max: 36.9 (08 Jun 2022 20:35)  T(F): 98.5 (09 Jun 2022 12:08), Max: 98.5 (09 Jun 2022 12:08)  HR: 98 (09 Jun 2022 12:08) (90 - 103)  BP: 102/72 (09 Jun 2022 12:08) (102/72 - 137/85)  BP(mean): --  RR: 18 (09 Jun 2022 12:08) (18 - 18)  SpO2: 93% (09 Jun 2022 12:08) (93% - 94%)    T(C): 36.9 (06-09-22 @ 12:08), Max: 37.3 (06-08-22 @ 04:53)  T(C): 36.9 (06-09-22 @ 12:08), Max: 37.3 (06-08-22 @ 04:53)  T(C): 36.9 (06-09-22 @ 12:08), Max: 37.3 (06-08-22 @ 04:53)    PHYSICAL EXAM:  HEENT: NC atraumatic  Neck: supple  Respiratory: no accessory muscle use, breathing comfortably  Cardiovascular: distant  Gastrointestinal: normal appearing, nondistended  Extremities: no clubbing, no cyanosis, LE's improved, properly dressed        LABS:                          11.7   11.72 )-----------( 317      ( 09 Jun 2022 08:19 )             35.9       WBC  11.72 06-09 @ 08:19  10.56 06-08 @ 07:19  8.14 06-07 @ 06:19  10.15 06-06 @ 12:10      06-09    139  |  102  |  12  ----------------------------<  108<H>  3.5   |  29  |  0.62    Ca    9.4      09 Jun 2022 08:19  Mg     2.2     06-08        Creatinine, Serum: 0.62 mg/dL (06-09-22 @ 08:19)  Creatinine, Serum: 0.74 mg/dL (06-08-22 @ 07:19)  Creatinine, Serum: 0.95 mg/dL (06-07-22 @ 06:19)  Creatinine, Serum: 0.88 mg/dL (06-06-22 @ 12:10)                INFLAMMATORY MARKERS  Auto Neutrophil #: 5.78 K/uL (06-07-22 @ 06:19)  Auto Lymphocyte #: 1.09 K/uL (06-07-22 @ 06:19)  Auto Neutrophil #: 8.44 K/uL (06-06-22 @ 12:10)  Auto Lymphocyte #: 0.49 K/uL (06-06-22 @ 12:10)      Auto Eosinophil #: 0.14 K/uL (06-07-22 @ 06:19)  Auto Eosinophil #: 0.05 K/uL (06-06-22 @ 12:10)                          D-Dimer Assay, Quantitative: 723 ng/mL DDU (06-06-22 @ 12:11)        MICROBIOLOGY:    MRSA/MSSA PCR (06.08.22 @ 15:32)    MRSA PCR Result.: NotDetec:     Staph Aureus PCR Result: Detected        RADIOLOGY & ADDITIONAL STUDIES:

## 2022-06-09 NOTE — PROGRESS NOTE ADULT - ASSESSMENT
56 yo female with PMH MM, DD, HTN, chronic back pain presenting to the ED with increased lower extremity edema and shortness of breath since past few days.  follows Dr. Keith Rosales (o/p card)    - p/w c/o B/L LE edema and shortness of breath X past few days prior to admission  - her LE edema appears cellulitic in nature  - D-dimer 723 and Pro BNP 20   - US LE shows no evidence of DVT  - CT Angio chest negative for PE  - volume status improving with LE edema though improving also states that her SOB had improved, on RA   - s/p IV Lasix 40mg, now on PO, bicarb improving    - Technically difficult ECHO showed normal LV & RV size and function EF 55-60%, trace MR and TR    - EKG: NSR, no acute ischemic changes noted    - Continue ASA and Lipitor     - BP and HR well controlled, monitor routine hemodynamics.    - Abx per ID   - Monitor and replete lytes, keep K>4, Mg>2.  - Will continue to follow.    Ani Gunn [facility]  Nurse Practitioner - Cardiology   Spectra #[phone]

## 2022-06-10 DIAGNOSIS — E11.9 TYPE 2 DIABETES MELLITUS WITHOUT COMPLICATIONS: ICD-10-CM

## 2022-06-10 LAB
ANION GAP SERPL CALC-SCNC: 6 MMOL/L — SIGNIFICANT CHANGE UP (ref 5–17)
BASOPHILS # BLD AUTO: 0.05 K/UL — SIGNIFICANT CHANGE UP (ref 0–0.2)
BASOPHILS NFR BLD AUTO: 0.5 % — SIGNIFICANT CHANGE UP (ref 0–2)
BUN SERPL-MCNC: 16 MG/DL — SIGNIFICANT CHANGE UP (ref 7–23)
CALCIUM SERPL-MCNC: 9.1 MG/DL — SIGNIFICANT CHANGE UP (ref 8.5–10.1)
CHLORIDE SERPL-SCNC: 103 MMOL/L — SIGNIFICANT CHANGE UP (ref 96–108)
CO2 SERPL-SCNC: 30 MMOL/L — SIGNIFICANT CHANGE UP (ref 22–31)
CREAT SERPL-MCNC: 0.75 MG/DL — SIGNIFICANT CHANGE UP (ref 0.5–1.3)
EGFR: 93 ML/MIN/1.73M2 — SIGNIFICANT CHANGE UP
EOSINOPHIL # BLD AUTO: 0.09 K/UL — SIGNIFICANT CHANGE UP (ref 0–0.5)
EOSINOPHIL NFR BLD AUTO: 0.9 % — SIGNIFICANT CHANGE UP (ref 0–6)
GLUCOSE SERPL-MCNC: 120 MG/DL — HIGH (ref 70–99)
HCT VFR BLD CALC: 36.9 % — SIGNIFICANT CHANGE UP (ref 34.5–45)
HGB BLD-MCNC: 11.8 G/DL — SIGNIFICANT CHANGE UP (ref 11.5–15.5)
IMM GRANULOCYTES NFR BLD AUTO: 1.2 % — SIGNIFICANT CHANGE UP (ref 0–1.5)
LYMPHOCYTES # BLD AUTO: 1.03 K/UL — SIGNIFICANT CHANGE UP (ref 1–3.3)
LYMPHOCYTES # BLD AUTO: 10.2 % — LOW (ref 13–44)
MCHC RBC-ENTMCNC: 32 GM/DL — SIGNIFICANT CHANGE UP (ref 32–36)
MCHC RBC-ENTMCNC: 32.1 PG — SIGNIFICANT CHANGE UP (ref 27–34)
MCV RBC AUTO: 100.3 FL — HIGH (ref 80–100)
MONOCYTES # BLD AUTO: 1.03 K/UL — HIGH (ref 0–0.9)
MONOCYTES NFR BLD AUTO: 10.2 % — SIGNIFICANT CHANGE UP (ref 2–14)
NEUTROPHILS # BLD AUTO: 7.79 K/UL — HIGH (ref 1.8–7.4)
NEUTROPHILS NFR BLD AUTO: 77 % — SIGNIFICANT CHANGE UP (ref 43–77)
NRBC # BLD: 0 /100 WBCS — SIGNIFICANT CHANGE UP (ref 0–0)
PLATELET # BLD AUTO: 312 K/UL — SIGNIFICANT CHANGE UP (ref 150–400)
POTASSIUM SERPL-MCNC: 4.2 MMOL/L — SIGNIFICANT CHANGE UP (ref 3.5–5.3)
POTASSIUM SERPL-SCNC: 4.2 MMOL/L — SIGNIFICANT CHANGE UP (ref 3.5–5.3)
RBC # BLD: 3.68 M/UL — LOW (ref 3.8–5.2)
RBC # FLD: 13.3 % — SIGNIFICANT CHANGE UP (ref 10.3–14.5)
SODIUM SERPL-SCNC: 139 MMOL/L — SIGNIFICANT CHANGE UP (ref 135–145)
WBC # BLD: 10.11 K/UL — SIGNIFICANT CHANGE UP (ref 3.8–10.5)
WBC # FLD AUTO: 10.11 K/UL — SIGNIFICANT CHANGE UP (ref 3.8–10.5)

## 2022-06-10 PROCEDURE — 99232 SBSQ HOSP IP/OBS MODERATE 35: CPT

## 2022-06-10 PROCEDURE — 99233 SBSQ HOSP IP/OBS HIGH 50: CPT

## 2022-06-10 RX ORDER — DEXAMETHASONE 0.5 MG/5ML
20 ELIXIR ORAL ONCE
Refills: 0 | Status: COMPLETED | OUTPATIENT
Start: 2022-06-10 | End: 2022-06-10

## 2022-06-10 RX ORDER — SODIUM CHLORIDE 9 MG/ML
1000 INJECTION, SOLUTION INTRAVENOUS
Refills: 0 | Status: DISCONTINUED | OUTPATIENT
Start: 2022-06-10 | End: 2022-06-12

## 2022-06-10 RX ORDER — DEXTROSE 50 % IN WATER 50 %
25 SYRINGE (ML) INTRAVENOUS ONCE
Refills: 0 | Status: DISCONTINUED | OUTPATIENT
Start: 2022-06-10 | End: 2022-06-12

## 2022-06-10 RX ORDER — INSULIN LISPRO 100/ML
VIAL (ML) SUBCUTANEOUS
Refills: 0 | Status: DISCONTINUED | OUTPATIENT
Start: 2022-06-10 | End: 2022-06-12

## 2022-06-10 RX ORDER — DEXTROSE 50 % IN WATER 50 %
12.5 SYRINGE (ML) INTRAVENOUS ONCE
Refills: 0 | Status: DISCONTINUED | OUTPATIENT
Start: 2022-06-10 | End: 2022-06-12

## 2022-06-10 RX ORDER — DIPHENHYDRAMINE HCL 50 MG
25 CAPSULE ORAL ONCE
Refills: 0 | Status: COMPLETED | OUTPATIENT
Start: 2022-06-10 | End: 2022-06-10

## 2022-06-10 RX ORDER — DEXTROSE 50 % IN WATER 50 %
15 SYRINGE (ML) INTRAVENOUS ONCE
Refills: 0 | Status: DISCONTINUED | OUTPATIENT
Start: 2022-06-10 | End: 2022-06-12

## 2022-06-10 RX ORDER — INSULIN LISPRO 100/ML
VIAL (ML) SUBCUTANEOUS AT BEDTIME
Refills: 0 | Status: DISCONTINUED | OUTPATIENT
Start: 2022-06-10 | End: 2022-06-12

## 2022-06-10 RX ORDER — GLUCAGON INJECTION, SOLUTION 0.5 MG/.1ML
1 INJECTION, SOLUTION SUBCUTANEOUS ONCE
Refills: 0 | Status: DISCONTINUED | OUTPATIENT
Start: 2022-06-10 | End: 2022-06-12

## 2022-06-10 RX ADMIN — ENOXAPARIN SODIUM 40 MILLIGRAM(S): 100 INJECTION SUBCUTANEOUS at 05:37

## 2022-06-10 RX ADMIN — ATORVASTATIN CALCIUM 20 MILLIGRAM(S): 80 TABLET, FILM COATED ORAL at 22:02

## 2022-06-10 RX ADMIN — Medication 81 MILLIGRAM(S): at 11:25

## 2022-06-10 RX ADMIN — Medication 0.5 MILLIGRAM(S): at 17:15

## 2022-06-10 RX ADMIN — BUPROPION HYDROCHLORIDE 150 MILLIGRAM(S): 150 TABLET, EXTENDED RELEASE ORAL at 11:23

## 2022-06-10 RX ADMIN — ENOXAPARIN SODIUM 40 MILLIGRAM(S): 100 INJECTION SUBCUTANEOUS at 17:15

## 2022-06-10 RX ADMIN — GABAPENTIN 300 MILLIGRAM(S): 400 CAPSULE ORAL at 22:01

## 2022-06-10 RX ADMIN — Medication 200 MILLIGRAM(S): at 17:15

## 2022-06-10 RX ADMIN — Medication 40 MILLIGRAM(S): at 05:38

## 2022-06-10 RX ADMIN — Medication 200 MILLIGRAM(S): at 05:37

## 2022-06-10 RX ADMIN — POLYETHYLENE GLYCOL 3350 17 GRAM(S): 17 POWDER, FOR SOLUTION ORAL at 11:22

## 2022-06-10 RX ADMIN — SENNA PLUS 2 TABLET(S): 8.6 TABLET ORAL at 22:01

## 2022-06-10 RX ADMIN — Medication 25 MILLIGRAM(S): at 02:56

## 2022-06-10 RX ADMIN — CEFTRIAXONE 100 MILLIGRAM(S): 500 INJECTION, POWDER, FOR SOLUTION INTRAMUSCULAR; INTRAVENOUS at 12:18

## 2022-06-10 RX ADMIN — MONTELUKAST 10 MILLIGRAM(S): 4 TABLET, CHEWABLE ORAL at 22:01

## 2022-06-10 RX ADMIN — Medication 20 MILLIGRAM(S): at 11:23

## 2022-06-10 NOTE — OCCUPATIONAL THERAPY INITIAL EVALUATION ADULT - LEVEL OF INDEPENDENCE: EATING, OT EVAL
pt able to feed self using spoon and able to open applesauce and waterbottle. Will assess feeding with fork in a f/u session./supervision

## 2022-06-10 NOTE — OCCUPATIONAL THERAPY INITIAL EVALUATION ADULT - ADDITIONAL COMMENTS
Pt lives in an apartment with people with developmental disabilities but reports she has been having increasing difficulty with ADLs requiring friend to assist. Pt reports anticipating moving to a group home to get more assistance. She reports she can feed herself but has always had some difficulty with cutting and issues with messy items (i.e. rice, salad) falling off her fork (states she benefits from use of spoon). Pt uses rollator for functional mobility but reports history of frequent falls and chronic back pain. Bathroom has a walk-in shower with grab bars and hand-held showerhead but pt sponge bathes at times.

## 2022-06-10 NOTE — PROGRESS NOTE ADULT - SUBJECTIVE AND OBJECTIVE BOX
[facility] -- Nico Ugarte Grossman, [place]man, Asher Salas Savella, Goodger: Office # [phone]    Follow Up:  SOB, LE edema    Subjective/Observations: Patient seen and examined, awake, alert, resting comfortably in bed, denies chest pain, dyspnea, palpitations or dizziness, orthopnea and PND. Tolerating room air. c/o increased edema b/l LE     REVIEW OF SYSTEMS: All review of systems is negative for eye, ENT, GI, , allergic, dermatologic, musculoskeletal and neurologic except as described above    PAST MEDICAL & SURGICAL HISTORY:  Multiple myeloma      Mild developmental delay      HTN (hypertension)      High cholesterol      Sciatica      Anxiety      Pre-diabetes      S/P removal of ovarian cyst      S/P removal of ovarian cyst      H/O basal cell carcinoma excision          MEDICATIONS  (STANDING):  acyclovir   Oral Tab/Cap 200 milliGRAM(s) Oral two times a day  aspirin enteric coated 81 milliGRAM(s) Oral daily  atorvastatin 20 milliGRAM(s) Oral at bedtime  buPROPion XL (24-Hour) . 150 milliGRAM(s) Oral daily  cefTRIAXone   IVPB 1000 milliGRAM(s) IV Intermittent every 24 hours  dexAMETHasone     Tablet 20 milliGRAM(s) Oral once  dextrose 5%. 1000 milliLiter(s) (50 mL/Hr) IV Continuous <Continuous>  dextrose 5%. 1000 milliLiter(s) (100 mL/Hr) IV Continuous <Continuous>  dextrose 50% Injectable 25 Gram(s) IV Push once  dextrose 50% Injectable 12.5 Gram(s) IV Push once  dextrose 50% Injectable 25 Gram(s) IV Push once  enoxaparin Injectable 40 milliGRAM(s) SubCutaneous every 12 hours  furosemide    Tablet 40 milliGRAM(s) Oral daily  gabapentin 300 milliGRAM(s) Oral at bedtime  glucagon  Injectable 1 milliGRAM(s) IntraMuscular once  insulin lispro (ADMELOG) corrective regimen sliding scale   SubCutaneous at bedtime  insulin lispro (ADMELOG) corrective regimen sliding scale   SubCutaneous three times a day before meals  montelukast 10 milliGRAM(s) Oral at bedtime  polyethylene glycol 3350 17 Gram(s) Oral daily  senna 2 Tablet(s) Oral at bedtime    MEDICATIONS  (PRN):  acetaminophen     Tablet .. 975 milliGRAM(s) Oral every 8 hours PRN Moderate Pain (4 - 6)  baclofen 20 milliGRAM(s) Oral every 12 hours PRN Muscle Spasm  dextrose Oral Gel 15 Gram(s) Oral once PRN Blood Glucose LESS THAN 70 milliGRAM(s)/deciliter  LORazepam     Tablet 0.5 milliGRAM(s) Oral daily PRN Anxiety    Allergies    fragrance (Unknown)  No Known Drug Allergies  Pollen, animal hair (Unknown)    Intolerances    Milk (Unknown)    Vital Signs Last 24 Hrs  T(C): 36.9 (10 Parth 2022 04:36), Max: 37 (09 Jun 2022 20:53)  T(F): 98.5 (10 Parth 2022 04:36), Max: 98.6 (09 Jun 2022 20:53)  HR: 92 (10 Parth 2022 05:34) (92 - 106)  BP: 126/82 (10 Parth 2022 04:36) (102/72 - 126/82)  BP(mean): --  RR: 18 (10 Parth 2022 04:36) (18 - 18)  SpO2: 93% (10 Parth 2022 04:36) (92% - 93%)  I&O's Summary    09 Jun 2022 07:01  -  10 Parth 2022 07:00  --------------------------------------------------------  IN: 290 mL / OUT: 200 mL / NET: 90 mL    10 Parth 2022 07:01  -  10 Parth 2022 10:26  --------------------------------------------------------  IN: 0 mL / OUT: 1200 mL / NET: -1200 mL          TELE:  97  PHYSICAL EXAM:  Constitutional: NAD, awake and alert, obese  HEENT: Moist Mucous Membranes, Anicteric  Pulmonary: Non-labored, breath sounds are clear bilaterally, diminished at bases  No wheezing, rales or rhonchi  Cardiovascular: Regular, S1 and S2, No murmurs, rubs, gallops or clicks  Gastrointestinal: Bowel Sounds present, soft, nontender.   Lymph: 1-3+ R>L peripheral edema. No lymphadenopathy.  Skin: B/L LE edema, erythema, warmth and tenderness w/ active blisters   Psych:  Mood & affect appropriate for situation    LABS: All Labs Reviewed:                        11.8   10.11 )-----------( 312      ( 10 Parth 2022 06:23 )             36.9                         11.7   11.72 )-----------( 317      ( 09 Jun 2022 08:19 )             35.9                         11.9   10.56 )-----------( 297      ( 08 Jun 2022 07:19 )             36.6     10 Parth 2022 06:22    139    |  103    |  16     ----------------------------<  120    4.2     |  30     |  0.75   09 Jun 2022 08:19    139    |  102    |  12     ----------------------------<  108    3.5     |  29     |  0.62   08 Jun 2022 07:19    139    |  100    |  13     ----------------------------<  118    3.5     |  33     |  0.74     Ca    9.1        10 Parth 2022 06:22  Ca    9.4        09 Jun 2022 08:19  Ca    9.4        08 Jun 2022 07:19  Mg     2.2       08 Jun 2022 07:19        Troponin I, High Sensitivity Result: 3.9 ng/L (06-06-22 @ 12:10)      D-Dimer Assay, Quantitative: 723 ng/mL DDU (06-06-22 @ 12:11)            12 Lead ECG:   Ventricular Rate 99 BPM    Atrial Rate 99 BPM    P-R Interval 134 ms    QRS Duration 84 ms    Q-T Interval 332 ms    QTC Calculation(Bazett) 426 ms    P Axis 40 degrees    R Axis 0 degrees    T Axis 29 degrees    Diagnosis Line Normal sinus rhythm  Cannot rule out Anterior infarct , age undetermined  Confirmed by GIN SALAS ([id number]) on 6/6/2022 1:13:42 PM (06-06-22 @ 11:41)    < from: Xray Chest 1 View- PORTABLE-Urgent (06.06.22 @ 13:20) >    ACC: [id number] EXAM:  XR CHEST PORTABLE URGENT 1V                          PROCEDURE DATE:  06/06/2022          INTERPRETATION:  AP supine chest on June 6, 2022 at 1:26 PM. Patient has   chest pain.    Shallow inspiration crowds the chest. Heart magnified by technique.    The aorta is prominent and possibly aneurysmal. This may be increased   from August 20, 2011.    Lungs remain grossly clear.    IMPRESSION: Prominent aorta possibly increased from prior. Consider CT   angiogram of the aorta. A note was posted on the [facility] ED discrepancy   site at 1:22 PM.    --- End of Report ---            SAM PRAJAPATI MD; Attending Radiologist  This document has been electronically signed. Jun 6 2022  1:23PM    < end of copied text >  < from: TTE Echo Complete w/o Contrast w/ Doppler (06.06.22 @ 18:08) >    ACC: [id number] EXAM:  ECHO TTE WO CON COMP W DOPP                          PROCEDURE DATE:  06/06/2022          INTERPRETATION:  INDICATION: Edema  Sonographer AS    Blood Pressure 149/79    Height 160 cm     Weight 117.9 kg       BSA 2.16   sq m    Dimensions:  LA 2.5       Normal Values: 2.0 - 4.0 cm  Ao 2.5        Normal Values: 2.0 - 3.8 cm  SEPTUM 1.1       Normal Values: 0.6 - 1.2 cm  PWT 1.0       Normal Values: 0.6 - 1.1 cm  LVIDd 4.4         Normal Values: 3.0 - 5.6 cm  LVIDs 3.0 Normal Values: 1.8 - 4.0 cm      OBSERVATIONS:  Technically difficult and limited study  Mitral Valve: normal, trace physiologic MR.  Aortic Valve/Aorta: Aortic valve is not well-visualized  Tricuspid Valve: normal with trace TR.  Pulmonic Valve: Not well-visualized  Left Atrium: normal  Right Atrium: Not well-visualized  Left Ventricle: The left ventricular endocardium is not well-visualized.   Grossly normal LV size and systolic function, estimated LVEF of 55-60%.   Cannot rule out segmental abnormalities  Right Ventricle: Grossly normal size and systolic function.  Pericardium: no significant pericardial effusion.  IVC measures 1.1 cm          IMPRESSION:  Technically difficult and limited study  The left ventricular endocardium is not well-visualized. Grossly normal   LV size and systolic function, estimated LVEF of 55-60%. Cannot rule out   segmental abnormalities  Grossly normal RV size and systolic function.  The aortic valve is not well-visualized  Trace physiologic MR and TR.    --- End of Report ---            DONOVAN BROWINNG MD; Attending Cardiologist  This document has been electronically signed. Jun 7 2022  2:16PM    < end of copied text >

## 2022-06-10 NOTE — PROGRESS NOTE ADULT - SUBJECTIVE AND OBJECTIVE BOX
Duy Jacobs MD  [phone]    SUBJECTIVE:  Resting in bed.  No SOB on RA, pulse ox 97% (checked by myself).  Last BM two days ago, denies feeling constipated.  No N/V.  No leg pain at rest, still reports some discomfort with certain movements.  NAD.    Tele: NSR, .    MEDICATIONS  (STANDING):  acyclovir   Oral Tab/Cap 200 milliGRAM(s) Oral two times a day  aspirin enteric coated 81 milliGRAM(s) Oral daily  atorvastatin 20 milliGRAM(s) Oral at bedtime  buPROPion XL (24-Hour) . 150 milliGRAM(s) Oral daily  cefTRIAXone   IVPB 1000 milliGRAM(s) IV Intermittent every 24 hours  enoxaparin Injectable 40 milliGRAM(s) SubCutaneous every 12 hours  furosemide    Tablet 40 milliGRAM(s) Oral daily  gabapentin 300 milliGRAM(s) Oral at bedtime  montelukast 10 milliGRAM(s) Oral at bedtime  polyethylene glycol 3350 17 Gram(s) Oral daily  senna 2 Tablet(s) Oral at bedtime    MEDICATIONS  (PRN):  acetaminophen     Tablet .. 975 milliGRAM(s) Oral every 8 hours PRN Moderate Pain (4 - 6)  baclofen 20 milliGRAM(s) Oral every 12 hours PRN Muscle Spasm  LORazepam     Tablet 0.5 milliGRAM(s) Oral daily PRN Anxiety      Vital Signs Last 24 Hrs  T(C): 36.9 (10 Parth 2022 04:36), Max: 37 (09 Jun 2022 20:53)  T(F): 98.5 (10 Parth 2022 04:36), Max: 98.6 (09 Jun 2022 20:53)  HR: 92 (10 Parth 2022 05:34) (92 - 106)  BP: 126/82 (10 Parth 2022 04:36) (102/72 - 126/82)  BP(mean): --  RR: 18 (10 Parth 2022 04:36) (18 - 18)  SpO2: 93% (10 Parth 2022 04:36) (92% - 93%)    CAPILLARY BLOOD GLUCOSE      POCT Blood Glucose.: 119 mg/dL (10 Parth 2022 07:55)  POCT Blood Glucose.: 129 mg/dL (09 Jun 2022 21:35)  POCT Blood Glucose.: 108 mg/dL (09 Jun 2022 17:01)  POCT Blood Glucose.: 108 mg/dL (09 Jun 2022 11:41)    I&O's Summary    09 Jun 2022 07:01  -  10 Parth 2022 07:00  --------------------------------------------------------  IN: 290 mL / OUT: 200 mL / NET: 90 mL    10 Parth 2022 07:01  -  10 Parth 2022 09:31  --------------------------------------------------------  IN: 0 mL / OUT: 1200 mL / NET: -1200 mL        PHYSICAL EXAM:  GENERAL: Looks stated age, NAD  CARDIOVASCULAR: Normal S1, S2  PULMONARY: Lungs clear to auscultation bilaterally. No wheezes/rales/rhonchi  GI: Abdomen non-distended, soft, Nontender.  Bowel sounds present  MSK/Ext/Derm:  1+ leg and foot edema b/l.  Left leg with distal erythema extending to the ankle, slightly more pronounced medially with mild warmth to touch.  No right leg erythema.  PSYCH: Normal Affect.      LABS:                        11.8   10.11 )-----------( 312      ( 10 Parth 2022 06:23 )             36.9     06-10    139  |  103  |  16  ----------------------------<  120<H>  4.2   |  30  |  0.75    Ca    9.1      10 Parth 2022 06:22                  RADIOLOGY & ADDITIONAL TESTS:

## 2022-06-10 NOTE — PROGRESS NOTE ADULT - NS ATTEND AMEND GEN_ALL_CORE FT
No signs of significant ischemia or volume overload. cont current care. Further cardiac workup will depend on clinical course. legs with mild le edema which is multifactorial. can give extra lasix IV today. No signs of significant ischemia   cont current care. Further cardiac workup will depend on clinical course.

## 2022-06-10 NOTE — PROGRESS NOTE ADULT - ASSESSMENT
58 yo female with PMHx MM, DD, HLD, anxiety/depression, sciatica, pre-diabetes, HTN, chronic back pain, mild mental retardation presenting to the ED with increased lower extremity edema. Pt states she was walking outside with her friend early Saturday AM when her friend fell and "took her with her", causing the pt to fall on gravel Pt will be enrolling in a group home in July/August as she cannot manage her own care.    RECOMMENDATIONS  1-LLE Cellulitis- may be due to fall, then superinfected and now with infection, discussed with Dr Morataya and recommend  -continue CEFTRIAXONE (started 6/7)   -neg MRSA so stopped doxycycline 6/9  -6/10- as discussed with Dr Morataya - slow improvement but improving so rec continued IV abx as otherwise very high risk for readmission    -if continues to improve can look at DC on Cefuroxime 500mg PO BID with last day 6/13  -continue wound care as directed by wound care consult    2-Multiple myeloma- agree with continued suppressive acyclovir, not sure if pt is on Bortezomib or other therapeutic that might trigger Associated Herpes Zoster in Patients with Multiple Myeloma, fine with steroids as part of Rx    other issues will coordinate with medicine    We will follow along in the care of this patient. Please call us at [phone] or text me directly on my cell# at [phone] with any concerns.

## 2022-06-10 NOTE — OCCUPATIONAL THERAPY INITIAL EVALUATION ADULT - ADL RETRAINING, OT EVAL
Assess feeding with various utensils within 1 week. Patient will dress lower body with moderate assistance, AE as needed within 1 week.

## 2022-06-10 NOTE — PROGRESS NOTE ADULT - PROBLEM SELECTOR PLAN 4
Mild sinus tachycardia.  CTA was limited, but negative for PE (and dopplers negative DVT) and patient is receiving DVT prophylaxis.  Case d/w outpatient cardiologist who reported that resting HR was 96 when seen in the office earlier this year.  Suspect tachycardia related to obesity/deconditioning, with chronic back pain and current infection possibly contributing as well.  Continue to monitor.

## 2022-06-10 NOTE — OCCUPATIONAL THERAPY INITIAL EVALUATION ADULT - PERTINENT HX OF CURRENT PROBLEM, REHAB EVAL
56 y/o female with PMHx MM, DD, HTN, chronic back pain presenting to the ED with increased lower extremity edema since the past few days. Admitted for b/l LE cellulitis.

## 2022-06-10 NOTE — PROGRESS NOTE ADULT - ASSESSMENT
56 yo female with PMH MM, DD, HTN, chronic back pain presenting to the ED with increased lower extremity edema and shortness of breath since past few days.  follows Dr. Keith Rosales (o/p card)    - p/w c/o B/L LE edema, SOB x past few days prior to admission  - her LE edema appears cellulitic in nature  - D-dimer 723 and Pro BNP 20   - US LE shows no evidence of DVT  - CT Angio chest negative for PE  - increased LE edema R> L, also pt states that she is not urinating as much as she used to, however denies SOB on RA   - s/p IV Lasix 40mg, now on PO, will reassess   - strict I/O's, contineu to monitor volume status   - TTE: technically difficult, normal LV & RV size and function EF 55-60%, trace MR and TR    - EKG: NSR, no acute ischemic changes noted    - Continue ASA and Lipitor     - BP and HR well controlled, monitor routine hemodynamics.    - Abx per ID   - Monitor and replete lytes, keep K>4, Mg>2.  - Will continue to follow.    Ani Gunn [facility]  Nurse Practitioner - Cardiology   Spectra #[phone]   58 yo female with PMH MM, DD, HTN, chronic back pain presenting to the ED with increased lower extremity edema and shortness of breath since past few days.  follows Dr. Keith Rosales (o/p card)    - p/w c/o B/L LE edema, SOB x past few days prior to admission  - her LE edema appears cellulitic in nature  - D-dimer 723 and Pro BNP 20   - US LE shows no evidence of DVT  - CT Angio chest negative for PE  - increased LE edema R> L, also pt states that she is not urinating as much as she used to, however denies SOB on RA   - s/p IV Lasix 40mg, now on PO, will reassess   - strict I/O's, contineu to monitor volume status   - TTE: technically difficult, normal LV & RV size and function EF 55-60%, trace MR and TR    - EKG: NSR, no acute ischemic changes noted    - Continue ASA and Lipitor     - BP and HR well controlled, monitor routine hemodynamics.  - telemetry: SR 90's no events x 48 hours, would dc telemonitoring     - Abx per ID   - Monitor and replete lytes, keep K>4, Mg>2.  - Will continue to follow.    KESHAWN Cabello  Nurse Practitioner - Cardiology   Spectra #[phone]

## 2022-06-10 NOTE — PROGRESS NOTE ADULT - PROBLEM SELECTOR PLAN 2
Bilateral leg edema, possibly worsened in setting of cellulitis.  Per patient, she has had long-standing leg edema that has been treated with Lasix, but does note that the edema has worsened over time.  Echo without evidence of heart failure.  Case d/w Dr. Sterling and outpatient cardiologist, Dr. Rosales who confirms that the patient has been treated with Lasix for LE edema with similar outpatient echo findings earlier this year.  Suspect underlying venous insufficiency in setting of obesity, DM.  Elevate legs, d/w with Dr. Rosales regarding possibly pursuing lymphedema pump as outpatient after cellulitis has resolved.  Also counseled patient regarding benefits of weight loss and low salt diet.  Continue Lasix.

## 2022-06-10 NOTE — OCCUPATIONAL THERAPY INITIAL EVALUATION ADULT - RANGE OF MOTION EXAMINATION, UPPER EXTREMITY
Pt is right hand dominant. BUE opposition x5 digits WFL. Mild tremor noted during functional activities./bilateral UE Active ROM was WFL  (within functional limits)

## 2022-06-10 NOTE — PROGRESS NOTE ADULT - PROBLEM SELECTOR PLAN 6
Previously diagnosed with pre-diabetes, now with A1C 6.6 diagnostic for DM.  Monitoring finger sticks, diabetes is diet-controlled with consistent carb diet.  Will add Admelog sliding scale for now as we are giving Decadron 20mg x1 today.  Nutrition consult appreciated, OT consult requested per their recommendation as patient reported some difficulties with feeding herself.  Case d/w CM and SW on 6/9, plan for rehab at discharge with possible transition to group home from rehab.

## 2022-06-10 NOTE — PROGRESS NOTE ADULT - SUBJECTIVE AND OBJECTIVE BOX
[facility] DIVISION of INFECTIOUS DISEASE  Kiran Jorgensen MD PhD, Amber Stewart MD, Lizbeth Delgado MD, Jennifer Sterling MD, Yeison Rizo MD  and providing coverage with Yousif Solis MD  Providing Infectious Disease Consultations at [facility], [facility], [facility]'s    Office# [phone] to schedule follow up appointments  Answering Service for urgent calls or New Consults [phone]  Cell# to text for urgent issues Kiran Jorgensen [phone]     infectious diseases progress note:    HOANG MICHEL is a 57y y. o. Female patient    No concerning overnight events    Allergies    fragrance (Unknown)  No Known Drug Allergies  Pollen, animal hair (Unknown)    Intolerances    Milk (Unknown)      ANTIBIOTICS/RELEVANT:  antimicrobials  acyclovir   Oral Tab/Cap 200 milliGRAM(s) Oral two times a day  cefTRIAXone   IVPB 1000 milliGRAM(s) IV Intermittent every 24 hours    immunologic:    OTHER:  acetaminophen     Tablet .. 975 milliGRAM(s) Oral every 8 hours PRN  aspirin enteric coated 81 milliGRAM(s) Oral daily  atorvastatin 20 milliGRAM(s) Oral at bedtime  baclofen 20 milliGRAM(s) Oral every 12 hours PRN  buPROPion XL (24-Hour) . 150 milliGRAM(s) Oral daily  enoxaparin Injectable 40 milliGRAM(s) SubCutaneous every 12 hours  furosemide    Tablet 40 milliGRAM(s) Oral daily  gabapentin 300 milliGRAM(s) Oral at bedtime  LORazepam     Tablet 0.5 milliGRAM(s) Oral daily PRN  montelukast 10 milliGRAM(s) Oral at bedtime  polyethylene glycol 3350 17 Gram(s) Oral daily  senna 2 Tablet(s) Oral at bedtime      Objective:  Vital Signs Last 24 Hrs  T(C): 36.9 (10 Parth 2022 04:36), Max: 37 (09 Jun 2022 20:53)  T(F): 98.5 (10 Parth 2022 04:36), Max: 98.6 (09 Jun 2022 20:53)  HR: 92 (10 Parth 2022 05:34) (92 - 106)  BP: 126/82 (10 Parth 2022 04:36) (102/72 - 126/82)  BP(mean): --  RR: 18 (10 Parth 2022 04:36) (18 - 18)  SpO2: 93% (10 Parth 2022 04:36) (92% - 93%)    T(C): 36.9 (06-10-22 @ 04:36), Max: 37 (06-09-22 @ 20:53)  T(C): 36.9 (06-10-22 @ 04:36), Max: 37.3 (06-08-22 @ 04:53)  T(C): 36.9 (06-10-22 @ 04:36), Max: 37.3 (06-08-22 @ 04:53)    PHYSICAL EXAM:  HEENT: NC atraumatic  Neck: supple  Respiratory: no accessory muscle use, breathing comfortably  Cardiovascular: distant  Gastrointestinal: normal appearing, nondistended  Extremities: no clubbing, no cyanosis, RLE mostly resolved, LLE with erythema, swelling, warmth, pitting edema        LABS:                          11.8   10.11 )-----------( 312      ( 10 Parth 2022 06:23 )             36.9       WBC  10.11 06-10 @ 06:23  11.72 06-09 @ 08:19  10.56 06-08 @ 07:19  8.14 06-07 @ 06:19  10.15 06-06 @ 12:10      06-10    139  |  103  |  16  ----------------------------<  120<H>  4.2   |  30  |  0.75    Ca    9.1      10 Parth 2022 06:22        Creatinine, Serum: 0.75 mg/dL (06-10-22 @ 06:22)  Creatinine, Serum: 0.62 mg/dL (06-09-22 @ 08:19)  Creatinine, Serum: 0.74 mg/dL (06-08-22 @ 07:19)  Creatinine, Serum: 0.95 mg/dL (06-07-22 @ 06:19)  Creatinine, Serum: 0.88 mg/dL (06-06-22 @ 12:10)                INFLAMMATORY MARKERS  Auto Neutrophil #: 7.79 K/uL (06-10-22 @ 06:23)  Auto Lymphocyte #: 1.03 K/uL (06-10-22 @ 06:23)  Auto Neutrophil #: 5.78 K/uL (06-07-22 @ 06:19)  Auto Lymphocyte #: 1.09 K/uL (06-07-22 @ 06:19)  Auto Neutrophil #: 8.44 K/uL (06-06-22 @ 12:10)  Auto Lymphocyte #: 0.49 K/uL (06-06-22 @ 12:10)      Auto Eosinophil #: 0.09 K/uL (06-10-22 @ 06:23)  Auto Eosinophil #: 0.14 K/uL (06-07-22 @ 06:19)  Auto Eosinophil #: 0.05 K/uL (06-06-22 @ 12:10)                          D-Dimer Assay, Quantitative: 723 ng/mL DDU (06-06-22 @ 12:11)        MICROBIOLOGY:              RADIOLOGY & ADDITIONAL STUDIES:

## 2022-06-10 NOTE — PROGRESS NOTE ADULT - PROBLEM SELECTOR PLAN 1
Suspect bilateral leg cellulitis in setting of underlying leg edema with blisters with areas of skin breakdown, new diagnosis of DM, and recent fall.  Right leg erythema has resolved, left leg erythema still present.  Case d/w Dr. Jorgensen, continue IV Ceftriaxone.  Blood cultures with no growth to date.  Continue wound care as per wound care NP recommendations.

## 2022-06-10 NOTE — PROGRESS NOTE ADULT - PROBLEM SELECTOR PLAN 5
Follows with oncologist, Dr. Mak Amin.  Patient reports taking Acyclovir and baby Aspirin, both prescribed by her oncologist, both being continued in hospital.  Patient also reports being due for dose of Decadron today.  Left message for Dr. Amin twice with my cell phone number, awaiting call back.  Called [facility], pharmacist reports that patient last prescribed in February Decadron 4mg tabs, five tabs weekly.  Will give Decadron 20mg x1 now.

## 2022-06-10 NOTE — PROGRESS NOTE ADULT - PROBLEM SELECTOR PLAN 3
Patient presented with c/o shortness of breath with progressively worsening LE edema.  LE dopplers negative for DVT, CT Chest Angio negative for PE or aortic aneurysm with clear lungs.  No hypoxia on RA, no complaints of orthopnea and no longer complaining of SOB.  Echo without evidence of heart failure.  Continue Lasix per cardiology.

## 2022-06-11 LAB
ANION GAP SERPL CALC-SCNC: 8 MMOL/L — SIGNIFICANT CHANGE UP (ref 5–17)
BUN SERPL-MCNC: 20 MG/DL — SIGNIFICANT CHANGE UP (ref 7–23)
CALCIUM SERPL-MCNC: 9.8 MG/DL — SIGNIFICANT CHANGE UP (ref 8.5–10.1)
CHLORIDE SERPL-SCNC: 102 MMOL/L — SIGNIFICANT CHANGE UP (ref 96–108)
CO2 SERPL-SCNC: 29 MMOL/L — SIGNIFICANT CHANGE UP (ref 22–31)
CREAT SERPL-MCNC: 0.7 MG/DL — SIGNIFICANT CHANGE UP (ref 0.5–1.3)
EGFR: 101 ML/MIN/1.73M2 — SIGNIFICANT CHANGE UP
GLUCOSE SERPL-MCNC: 127 MG/DL — HIGH (ref 70–99)
POTASSIUM SERPL-MCNC: 3.7 MMOL/L — SIGNIFICANT CHANGE UP (ref 3.5–5.3)
POTASSIUM SERPL-SCNC: 3.7 MMOL/L — SIGNIFICANT CHANGE UP (ref 3.5–5.3)
SARS-COV-2 RNA SPEC QL NAA+PROBE: SIGNIFICANT CHANGE UP
SODIUM SERPL-SCNC: 139 MMOL/L — SIGNIFICANT CHANGE UP (ref 135–145)

## 2022-06-11 PROCEDURE — 99232 SBSQ HOSP IP/OBS MODERATE 35: CPT

## 2022-06-11 PROCEDURE — 99233 SBSQ HOSP IP/OBS HIGH 50: CPT

## 2022-06-11 RX ORDER — FUROSEMIDE 40 MG
40 TABLET ORAL ONCE
Refills: 0 | Status: COMPLETED | OUTPATIENT
Start: 2022-06-11 | End: 2022-06-11

## 2022-06-11 RX ADMIN — ENOXAPARIN SODIUM 40 MILLIGRAM(S): 100 INJECTION SUBCUTANEOUS at 18:12

## 2022-06-11 RX ADMIN — Medication 0.5 MILLIGRAM(S): at 08:11

## 2022-06-11 RX ADMIN — MONTELUKAST 10 MILLIGRAM(S): 4 TABLET, CHEWABLE ORAL at 22:08

## 2022-06-11 RX ADMIN — SENNA PLUS 2 TABLET(S): 8.6 TABLET ORAL at 22:08

## 2022-06-11 RX ADMIN — Medication 200 MILLIGRAM(S): at 18:11

## 2022-06-11 RX ADMIN — Medication 40 MILLIGRAM(S): at 06:14

## 2022-06-11 RX ADMIN — Medication 200 MILLIGRAM(S): at 06:14

## 2022-06-11 RX ADMIN — BUPROPION HYDROCHLORIDE 150 MILLIGRAM(S): 150 TABLET, EXTENDED RELEASE ORAL at 12:55

## 2022-06-11 RX ADMIN — ENOXAPARIN SODIUM 40 MILLIGRAM(S): 100 INJECTION SUBCUTANEOUS at 06:14

## 2022-06-11 RX ADMIN — GABAPENTIN 300 MILLIGRAM(S): 400 CAPSULE ORAL at 22:08

## 2022-06-11 RX ADMIN — ATORVASTATIN CALCIUM 20 MILLIGRAM(S): 80 TABLET, FILM COATED ORAL at 22:08

## 2022-06-11 RX ADMIN — Medication 81 MILLIGRAM(S): at 12:55

## 2022-06-11 RX ADMIN — Medication 40 MILLIGRAM(S): at 12:49

## 2022-06-11 RX ADMIN — CEFTRIAXONE 100 MILLIGRAM(S): 500 INJECTION, POWDER, FOR SOLUTION INTRAMUSCULAR; INTRAVENOUS at 12:51

## 2022-06-11 NOTE — PROGRESS NOTE ADULT - SUBJECTIVE AND OBJECTIVE BOX
[facility], Division of Infectious Diseases  RUBIO Jimenez Y. Patel, S. Shah, G. Casimir  [phone]  ([phone] - weekdays after 5pm and weekends)    Name: HOANG MICHEL  Age/Gender: 57y Female  MRN: [id number]    Interval History:  Patient seen and examined this morning.   Feels well, has no new complaints.   Notes reviewed  No concerning overnight events  Afebrile   Allergies: fragrance (Unknown)  No Known Drug Allergies  Pollen, animal hair (Unknown)      Objective:  Vitals:   T(F): 97.4 (06-11-22 @ 04:55), Max: 97.6 (06-10-22 @ 20:56)  HR: 100 (06-11-22 @ 04:55) (99 - 100)  BP: 138/91 (06-11-22 @ 04:55) (121/77 - 144/85)  RR: 19 (06-11-22 @ 04:55) (17 - 19)  SpO2: 91% (06-11-22 @ 04:55) (91% - 92%)  Physical Examination:  General: no acute distress  HEENT: NC/AT, anicteric, neck supple  Respiratory: no acc muscle use, breathing comfortably  Cardiovascular: S1 and S2 present  Gastrointestinal: normal appearing, nondistended  Extremities: b/l LE edema, no cyanosis  Skin: RLE with clean dressing, no erythema, LLE with mid erythema, cool to touch, no TTP    Laboratory Studies:  CBC:                       11.8   10.11 )-----------( 312      ( 10 Parth 2022 06:23 )             36.9     WBC Trend:  10.11 06-10-22 @ 06:23  11.72 06-09-22 @ 08:19  10.56 06-08-22 @ 07:19  8.14 06-07-22 @ 06:19  10.15 06-06-22 @ 12:10    CMP: 06-11    139  |  102  |  20  ----------------------------<  127<H>  3.7   |  29  |  0.70    Ca    9.8      11 Jun 2022 08:19    Creatinine, Serum: 0.70 mg/dL (06-11-22 @ 08:19)  Creatinine, Serum: 0.75 mg/dL (06-10-22 @ 06:22)  Creatinine, Serum: 0.62 mg/dL (06-09-22 @ 08:19)  Creatinine, Serum: 0.74 mg/dL (06-08-22 @ 07:19)  Creatinine, Serum: 0.95 mg/dL (06-07-22 @ 06:19)  Creatinine, Serum: 0.88 mg/dL (06-06-22 @ 12:10)    Microbiology: reviewed   Culture - Blood (collected 06-07-22 @ 09:40)  Source: .Blood Blood  Preliminary Report (06-08-22 @ 10:01):    No growth to date.    Culture - Blood (collected 06-07-22 @ 09:40)  Source: .Blood Blood  Preliminary Report (06-08-22 @ 10:01):    No growth to date.    Radiology: reviewed     Medications:  acetaminophen     Tablet .. 975 milliGRAM(s) Oral every 8 hours PRN  acyclovir   Oral Tab/Cap 200 milliGRAM(s) Oral two times a day  aspirin enteric coated 81 milliGRAM(s) Oral daily  atorvastatin 20 milliGRAM(s) Oral at bedtime  baclofen 20 milliGRAM(s) Oral every 12 hours PRN  buPROPion XL (24-Hour) . 150 milliGRAM(s) Oral daily  cefTRIAXone   IVPB 1000 milliGRAM(s) IV Intermittent every 24 hours  dextrose 5%. 1000 milliLiter(s) IV Continuous <Continuous>  dextrose 5%. 1000 milliLiter(s) IV Continuous <Continuous>  dextrose 50% Injectable 25 Gram(s) IV Push once  dextrose 50% Injectable 12.5 Gram(s) IV Push once  dextrose 50% Injectable 25 Gram(s) IV Push once  dextrose Oral Gel 15 Gram(s) Oral once PRN  enoxaparin Injectable 40 milliGRAM(s) SubCutaneous every 12 hours  furosemide    Tablet 40 milliGRAM(s) Oral daily  furosemide   Injectable 40 milliGRAM(s) IV Push once  gabapentin 300 milliGRAM(s) Oral at bedtime  glucagon  Injectable 1 milliGRAM(s) IntraMuscular once  insulin lispro (ADMELOG) corrective regimen sliding scale   SubCutaneous at bedtime  insulin lispro (ADMELOG) corrective regimen sliding scale   SubCutaneous three times a day before meals  LORazepam     Tablet 0.5 milliGRAM(s) Oral daily PRN  montelukast 10 milliGRAM(s) Oral at bedtime  polyethylene glycol 3350 17 Gram(s) Oral daily  senna 2 Tablet(s) Oral at bedtime    Antimicrobials:  acyclovir   Oral Tab/Cap 200 milliGRAM(s) Oral two times a day  cefTRIAXone   IVPB 1000 milliGRAM(s) IV Intermittent every 24 hours

## 2022-06-11 NOTE — PROGRESS NOTE ADULT - ASSESSMENT
56 yo female with PMH MM, DD, HTN, chronic back pain presenting to the ED with increased lower extremity edema and shortness of breath since past few day      LE edema   - US LE shows no evidence of DVT  - CT Angio chest negative for PE  -LE edema in setting of cellulitis and volume overload  -encouraged 1.2 liter fluid restriction  -would give additional 40mg IV lasix today and reassess    - TTE: technically difficult, normal LV & RV size and function EF 55-60%, trace MR and TR  - EKG: NSR, no acute ischemic changes noted    - Continue ASA and Lipitor    - Abx per ID   - Monitor and replete lytes, keep K>4, Mg>2.  Tonya Carvajal FNP-C  Cardiology NP  [id number] [phone]

## 2022-06-11 NOTE — PROGRESS NOTE ADULT - PROBLEM SELECTOR PLAN 1
Suspect bilateral leg cellulitis in setting of underlying leg edema with blisters with areas of skin breakdown, new diagnosis of DM, and recent fall.  Right leg erythema has resolved, left leg erythema still present.  Will continue IV Ceftriaxone for now.  Blood cultures with no growth to date.  Continue wound care as per wound care NP recommendations.

## 2022-06-11 NOTE — PROGRESS NOTE ADULT - NUTRITIONAL ASSESSMENT
This patient has been assessed with a concern for Malnutrition and has been determined to have a diagnosis/diagnoses of Morbid obesity (BMI > 40).    This patient is being managed with:   Diet DASH/TLC-  Sodium & Cholesterol Restricted  Consistent Carbohydrate {Evening Snack}  Entered: Jun 7 2022 11:16AM    

## 2022-06-11 NOTE — PROGRESS NOTE ADULT - ASSESSMENT
56 yo female with PMHx MM, DD, HLD, anxiety/depression, sciatica, pre-diabetes, HTN, chronic back pain, mild mental retardation presenting to the ED with increased lower extremity edema. Pt states she was walking outside with her friend early Saturday AM when her friend fell and "took her with her", causing the pt to fall on gravel Pt will be enrolling in a group home in July/August as she cannot manage her own care.    RECOMMENDATIONS  1-LLE Cellulitis- may be due to fall, then superinfected and now with infection, discussed with Dr Morataya and recommend  -neg MRSA so stopped doxycycline 6/9  -6/10- as discussed with Dr Morataya - slow improvement but improving so rec continued IV abx as otherwise very high risk for readmission  6/11 LLE with mild erythema, leg cool to touch and nontender  Continue on ceftriaxone (6/7-- ) while inpt   rec elevate lower extremities, per RN for IV lasix today    -continues to improve, can DC on Cefuroxime 500mg PO BID with last day 6/13  -continue wound care as directed by wound care consult    2-Multiple myeloma- agree with continued suppressive acyclovir, not sure if pt is on Bortezomib or other therapeutic that might trigger Associated Herpes Zoster in Patients with Multiple Myeloma, fine with steroids as part of Rx  other issues per medicine      D/w Dr. Valderrama.   Jennifer Sterling M.D.  [facility], Division of Infectious Diseases  [phone]  After 5pm on weekdays and all day on weekends - please call [phone]

## 2022-06-11 NOTE — PROGRESS NOTE ADULT - TIME BILLING
Reviewing medical record including consultant recommendations, labs, vitals, medications, orders, imaging, and discussion of plan of care with patient, consultants, nursing.

## 2022-06-11 NOTE — PROGRESS NOTE ADULT - PROBLEM SELECTOR PLAN 2
Bilateral leg edema, possibly worsened in setting of cellulitis.  Per patient, she has had long-standing leg edema that has been treated with Lasix, but does note that the edema has worsened over time.  Echo without evidence of heart failure.  Suspect underlying venous insufficiency in setting of obesity, DM.    -Elevate legs, possible lymphedema pump as outpatient after cellulitis has resolved.    -Patient has been counseled on benefits of weight loss and low salt diet.  -Continue Lasix, +1 dose of IV Lasix ordered by cardio today.

## 2022-06-11 NOTE — PROGRESS NOTE ADULT - SUBJECTIVE AND OBJECTIVE BOX
INTERVAL HISTORY: Patient seen and examined. Resting in chair comfortably. No complaints today. No shortness of breath, no chest pain, no palpitations, no nausea, no vomiting, no diarrhea. Reports she things legs may be a bit better.     MEDICATIONS  (STANDING):  acyclovir   Oral Tab/Cap 200 milliGRAM(s) Oral two times a day  aspirin enteric coated 81 milliGRAM(s) Oral daily  atorvastatin 20 milliGRAM(s) Oral at bedtime  buPROPion XL (24-Hour) . 150 milliGRAM(s) Oral daily  cefTRIAXone   IVPB 1000 milliGRAM(s) IV Intermittent every 24 hours  dextrose 5%. 1000 milliLiter(s) (50 mL/Hr) IV Continuous <Continuous>  dextrose 5%. 1000 milliLiter(s) (100 mL/Hr) IV Continuous <Continuous>  dextrose 50% Injectable 25 Gram(s) IV Push once  dextrose 50% Injectable 12.5 Gram(s) IV Push once  dextrose 50% Injectable 25 Gram(s) IV Push once  enoxaparin Injectable 40 milliGRAM(s) SubCutaneous every 12 hours  furosemide    Tablet 40 milliGRAM(s) Oral daily  furosemide   Injectable 40 milliGRAM(s) IV Push once  gabapentin 300 milliGRAM(s) Oral at bedtime  glucagon  Injectable 1 milliGRAM(s) IntraMuscular once  insulin lispro (ADMELOG) corrective regimen sliding scale   SubCutaneous at bedtime  insulin lispro (ADMELOG) corrective regimen sliding scale   SubCutaneous three times a day before meals  montelukast 10 milliGRAM(s) Oral at bedtime  polyethylene glycol 3350 17 Gram(s) Oral daily  senna 2 Tablet(s) Oral at bedtime    MEDICATIONS  (PRN):  acetaminophen     Tablet .. 975 milliGRAM(s) Oral every 8 hours PRN Moderate Pain (4 - 6)  baclofen 20 milliGRAM(s) Oral every 12 hours PRN Muscle Spasm  dextrose Oral Gel 15 Gram(s) Oral once PRN Blood Glucose LESS THAN 70 milliGRAM(s)/deciliter  LORazepam     Tablet 0.5 milliGRAM(s) Oral daily PRN Anxiety        Vital Signs Last 24 Hrs  T(C): 36.3 (11 Jun 2022 04:55), Max: 36.4 (10 Parth 2022 20:56)  T(F): 97.4 (11 Jun 2022 04:55), Max: 97.6 (10 Parth 2022 20:56)  HR: 100 (11 Jun 2022 04:55) (99 - 100)  BP: 138/91 (11 Jun 2022 04:55) (121/77 - 144/85)  BP(mean): --  RR: 19 (11 Jun 2022 04:55) (17 - 19)  SpO2: 91% (11 Jun 2022 04:55) (91% - 92%)    CAPILLARY BLOOD GLUCOSE  POCT Blood Glucose.: 128 mg/dL (11 Jun 2022 11:43)  POCT Blood Glucose.: 122 mg/dL (11 Jun 2022 08:08)  POCT Blood Glucose.: 167 mg/dL (10 Parth 2022 21:10)  POCT Blood Glucose.: 149 mg/dL (10 Parth 2022 16:50)      I&O's Summary    10 Parth 2022 07:01  -  11 Jun 2022 07:00  --------------------------------------------------------  IN: 0 mL / OUT: 1200 mL / NET: -1200 mL      PHYSICAL EXAM:  GENERAL: NAD  CARDIOVASCULAR: Normal S1, S2  PULMONARY: Lungs clear to auscultation bilaterally. No wheezes/rales/rhonchi  GI: Abdomen non-distended, soft, Nontender.  Bowel sounds present  EXT:  +b/l LE edema, LLE erythema and small wounds.   NEURO: no gross focal deficits, awake and alert    LABS:                         11.8   10.11 )-----------( 312      ( 10 Parth 2022 06:23 )             36.9   06-11    139  |  102  |  20  ----------------------------<  127<H>  3.7   |  29  |  0.70    Ca    9.8      11 Jun 2022 08:19

## 2022-06-11 NOTE — PROGRESS NOTE ADULT - SUBJECTIVE AND OBJECTIVE BOX
[facility] -- Nico Ugarte, Tanvi Edmonds Pannella, Patel, Savella  Office # [phone]      Follow Up:    LE edema   Subjective/Observations:   No events overnight resting comfortably in chair on room air   No complaints of chest pain, dyspnea, or palpitations reported. No signs of orthopnea or PND.    REVIEW OF SYSTEMS: All other review of systems is negative unless indicated above    PAST MEDICAL & SURGICAL HISTORY:  Multiple myeloma      Mild developmental delay      HTN (hypertension)      High cholesterol      Sciatica      Anxiety      Pre-diabetes      S/P removal of ovarian cyst      S/P removal of ovarian cyst      H/O basal cell carcinoma excision          MEDICATIONS  (STANDING):  acyclovir   Oral Tab/Cap 200 milliGRAM(s) Oral two times a day  aspirin enteric coated 81 milliGRAM(s) Oral daily  atorvastatin 20 milliGRAM(s) Oral at bedtime  buPROPion XL (24-Hour) . 150 milliGRAM(s) Oral daily  cefTRIAXone   IVPB 1000 milliGRAM(s) IV Intermittent every 24 hours  dextrose 5%. 1000 milliLiter(s) (50 mL/Hr) IV Continuous <Continuous>  dextrose 5%. 1000 milliLiter(s) (100 mL/Hr) IV Continuous <Continuous>  dextrose 50% Injectable 25 Gram(s) IV Push once  dextrose 50% Injectable 12.5 Gram(s) IV Push once  dextrose 50% Injectable 25 Gram(s) IV Push once  enoxaparin Injectable 40 milliGRAM(s) SubCutaneous every 12 hours  furosemide    Tablet 40 milliGRAM(s) Oral daily  gabapentin 300 milliGRAM(s) Oral at bedtime  glucagon  Injectable 1 milliGRAM(s) IntraMuscular once  insulin lispro (ADMELOG) corrective regimen sliding scale   SubCutaneous at bedtime  insulin lispro (ADMELOG) corrective regimen sliding scale   SubCutaneous three times a day before meals  montelukast 10 milliGRAM(s) Oral at bedtime  polyethylene glycol 3350 17 Gram(s) Oral daily  senna 2 Tablet(s) Oral at bedtime    MEDICATIONS  (PRN):  acetaminophen     Tablet .. 975 milliGRAM(s) Oral every 8 hours PRN Moderate Pain (4 - 6)  baclofen 20 milliGRAM(s) Oral every 12 hours PRN Muscle Spasm  dextrose Oral Gel 15 Gram(s) Oral once PRN Blood Glucose LESS THAN 70 milliGRAM(s)/deciliter  LORazepam     Tablet 0.5 milliGRAM(s) Oral daily PRN Anxiety      Allergies    fragrance (Unknown)  No Known Drug Allergies  Pollen, animal hair (Unknown)    Intolerances    Milk (Unknown)      Vital Signs Last 24 Hrs  T(C): 36.3 (11 Jun 2022 04:55), Max: 36.8 (10 Parth 2022 11:34)  T(F): 97.4 (11 Jun 2022 04:55), Max: 98.2 (10 Parth 2022 11:34)  HR: 100 (11 Jun 2022 04:55) (99 - 104)  BP: 138/91 (11 Jun 2022 04:55) (121/74 - 144/85)  BP(mean): --  RR: 19 (11 Jun 2022 04:55) (17 - 19)  SpO2: 91% (11 Jun 2022 04:55) (91% - 94%)    I&O's Summary    10 Parth 2022 07:01  -  11 Jun 2022 07:00  --------------------------------------------------------  IN: 0 mL / OUT: 1200 mL / NET: -1200 mL          PHYSICAL EXAM:  TELE: not on tele   Constitutional: NAD, awake and alert, obese  HEENT: Moist Mucous Membranes, Anicteric  Pulmonary: Non-labored, breath sounds are clear bilaterally, No wheezing, crackles or rhonchi  Cardiovascular: Regular, S1 and S2 nl, No murmurs, rubs, gallops or clicks  Gastrointestinal: Bowel Sounds present, soft, nontender.   Lymph: +bilateral peripheral edema.  Skin: B/L LE edema, erythema, warmth and tenderness w/ active blisters  Psych:  Mood & affect appropriate    LABS: All Labs Reviewed:                        11.8   10.11 )-----------( 312      ( 10 Parth 2022 06:23 )             36.9                         11.7   11.72 )-----------( 317      ( 09 Jun 2022 08:19 )             35.9     11 Jun 2022 08:19    139    |  102    |  20     ----------------------------<  127    3.7     |  29     |  0.70   10 Parth 2022 06:22    139    |  103    |  16     ----------------------------<  120    4.2     |  30     |  0.75   09 Jun 2022 08:19    139    |  102    |  12     ----------------------------<  108    3.5     |  29     |  0.62     Ca    9.8        11 Jun 2022 08:19  Ca    9.1        10 Jun 2022 06:22  Ca    9.4        09 Jun 2022 08:19      12 Lead ECG:   Ventricular Rate 99 BPM    Atrial Rate 99 BPM    P-R Interval 134 ms    QRS Duration 84 ms    Q-T Interval 332 ms    QTC Calculation(Bazett) 426 ms    P Axis 40 degrees    R Axis 0 degrees    T Axis 29 degrees    Diagnosis Line Normal sinus rhythm  Cannot rule out Anterior infarct , age undetermined  Confirmed by GIN SALAS ([id number]) on 6/6/2022 1:13:42 PM (06-06-22 @ 11:41)    < from: Xray Chest 1 View- PORTABLE-Urgent (06.06.22 @ 13:20) >    ACC: [id number] EXAM:  XR CHEST PORTABLE URGENT 1V                          PROCEDURE DATE:  06/06/2022          INTERPRETATION:  AP supine chest on June 6, 2022 at 1:26 PM. Patient has   chest pain.    Shallow inspiration crowds the chest. Heart magnified by technique.    The aorta is prominent and possibly aneurysmal. This may be increased   from August 20, 2011.    Lungs remain grossly clear.    IMPRESSION: Prominent aorta possibly increased from prior. Consider CT   angiogram of the aorta. [facility]   [facility] at 1:22 PM.    --- End of Report ---            SAM PRAJAPATI MD; Attending Radiologist  This document has been electronically signed. Jun 6 2022  1:23PM    < end of copied text >  < from: TTE Echo Complete w/o Contrast w/ Doppler (06.06.22 @ 18:08) >    ACC: [id number] EXAM:  ECHO TTE WO CON COMP W DOPP                          PROCEDURE DATE:  06/06/2022          INTERPRETATION:  INDICATION: Edema  Sonographer AS    Blood Pressure 149/79    Height 160 cm     Weight 117.9 kg       BSA 2.16   sq m    Dimensions:  LA 2.5       Normal Values: 2.0 - 4.0 cm  Ao 2.5        Normal Values: 2.0 - 3.8 cm  SEPTUM 1.1       Normal Values: 0.6 - 1.2 cm  PWT 1.0       Normal Values: 0.6 - 1.1 cm  LVIDd 4.4         Normal Values: 3.0 - 5.6 cm  LVIDs 3.0 Normal Values: 1.8 - 4.0 cm      OBSERVATIONS:  Technically difficult and limited study  Mitral Valve: normal, trace physiologic MR.  Aortic Valve/Aorta: Aortic valve is not well-visualized  Tricuspid Valve: normal with trace TR.  Pulmonic Valve: Not well-visualized  Left Atrium: normal  Right Atrium: Not well-visualized  Left Ventricle: The left ventricular endocardium is not well-visualized.   Grossly normal LV size and systolic function, estimated LVEF of 55-60%.   Cannot rule out segmental abnormalities  Right Ventricle: Grossly normal size and systolic function.  Pericardium: no significant pericardial effusion.  IVC measures 1.1 cm          IMPRESSION:  Technically difficult and limited study  The left ventricular endocardium is not well-visualized. Grossly normal   LV size and systolic function, estimated LVEF of 55-60%. Cannot rule out   segmental abnormalities  Grossly normal RV size and systolic function.  The aortic valve is not well-visualized  Trace physiologic MR and TR.    --- End of Report ---

## 2022-06-11 NOTE — PROGRESS NOTE ADULT - PROBLEM SELECTOR PLAN 6
Previously diagnosed with pre-diabetes, now with A1C 6.6 diagnostic for DM.  Monitoring finger sticks, diabetes is diet-controlled with consistent carb diet.  On Admelog sliding scale for now as we are giving Decadron 20mg x1 today.  Nutrition consult appreciated, OT consult requested per their recommendation as patient reported some difficulties with feeding herself.  Case d/w CM and SW on 6/9, plan for rehab at discharge with possible transition to group home from rehab.

## 2022-06-11 NOTE — PROGRESS NOTE ADULT - PROBLEM SELECTOR PLAN 5
Follows with oncologist, Dr. Mak Amin.  Patient reports taking Acyclovir and baby Aspirin, both prescribed by her oncologist, both being continued in hospital.  Patient also reports being due for dose of Decadron today.  Messages were left for Dr. Amin twice, but no call back yet.  Given Decadron 20mg x1 on 6/10, per prescription records from [facility].

## 2022-06-11 NOTE — PROGRESS NOTE ADULT - NS ATTEND AMEND GEN_ALL_CORE FT
still with lower ext edema. can given extra lasix IV. No signs of significant ischemia  cont current care. Further cardiac workup will depend on clinical course.

## 2022-06-12 ENCOUNTER — TRANSCRIPTION ENCOUNTER (OUTPATIENT)
Age: 57
End: 2022-06-12

## 2022-06-12 VITALS
SYSTOLIC BLOOD PRESSURE: 139 MMHG | RESPIRATION RATE: 18 BRPM | HEART RATE: 112 BPM | OXYGEN SATURATION: 100 % | TEMPERATURE: 98 F | DIASTOLIC BLOOD PRESSURE: 78 MMHG

## 2022-06-12 LAB
ANION GAP SERPL CALC-SCNC: 9 MMOL/L — SIGNIFICANT CHANGE UP (ref 5–17)
BUN SERPL-MCNC: 24 MG/DL — HIGH (ref 7–23)
CALCIUM SERPL-MCNC: 9.4 MG/DL — SIGNIFICANT CHANGE UP (ref 8.5–10.1)
CHLORIDE SERPL-SCNC: 101 MMOL/L — SIGNIFICANT CHANGE UP (ref 96–108)
CO2 SERPL-SCNC: 28 MMOL/L — SIGNIFICANT CHANGE UP (ref 22–31)
CREAT SERPL-MCNC: 0.74 MG/DL — SIGNIFICANT CHANGE UP (ref 0.5–1.3)
CULTURE RESULTS: SIGNIFICANT CHANGE UP
CULTURE RESULTS: SIGNIFICANT CHANGE UP
EGFR: 94 ML/MIN/1.73M2 — SIGNIFICANT CHANGE UP
GLUCOSE SERPL-MCNC: 112 MG/DL — HIGH (ref 70–99)
HCT VFR BLD CALC: 35 % — SIGNIFICANT CHANGE UP (ref 34.5–45)
HGB BLD-MCNC: 11.7 G/DL — SIGNIFICANT CHANGE UP (ref 11.5–15.5)
MCHC RBC-ENTMCNC: 32.6 PG — SIGNIFICANT CHANGE UP (ref 27–34)
MCHC RBC-ENTMCNC: 33.4 GM/DL — SIGNIFICANT CHANGE UP (ref 32–36)
MCV RBC AUTO: 97.5 FL — SIGNIFICANT CHANGE UP (ref 80–100)
NRBC # BLD: 0 /100 WBCS — SIGNIFICANT CHANGE UP (ref 0–0)
PLATELET # BLD AUTO: 398 K/UL — SIGNIFICANT CHANGE UP (ref 150–400)
POTASSIUM SERPL-MCNC: 3.3 MMOL/L — LOW (ref 3.5–5.3)
POTASSIUM SERPL-SCNC: 3.3 MMOL/L — LOW (ref 3.5–5.3)
RBC # BLD: 3.59 M/UL — LOW (ref 3.8–5.2)
RBC # FLD: 13 % — SIGNIFICANT CHANGE UP (ref 10.3–14.5)
SODIUM SERPL-SCNC: 138 MMOL/L — SIGNIFICANT CHANGE UP (ref 135–145)
SPECIMEN SOURCE: SIGNIFICANT CHANGE UP
SPECIMEN SOURCE: SIGNIFICANT CHANGE UP
WBC # BLD: 10.15 K/UL — SIGNIFICANT CHANGE UP (ref 3.8–10.5)
WBC # FLD AUTO: 10.15 K/UL — SIGNIFICANT CHANGE UP (ref 3.8–10.5)

## 2022-06-12 PROCEDURE — 99238 HOSP IP/OBS DSCHRG MGMT 30/<: CPT

## 2022-06-12 PROCEDURE — 97166 OT EVAL MOD COMPLEX 45 MIN: CPT

## 2022-06-12 PROCEDURE — 83036 HEMOGLOBIN GLYCOSYLATED A1C: CPT

## 2022-06-12 PROCEDURE — 87635 SARS-COV-2 COVID-19 AMP PRB: CPT

## 2022-06-12 PROCEDURE — 82962 GLUCOSE BLOOD TEST: CPT

## 2022-06-12 PROCEDURE — 84484 ASSAY OF TROPONIN QUANT: CPT

## 2022-06-12 PROCEDURE — 96374 THER/PROPH/DIAG INJ IV PUSH: CPT

## 2022-06-12 PROCEDURE — 87640 STAPH A DNA AMP PROBE: CPT

## 2022-06-12 PROCEDURE — 99285 EMERGENCY DEPT VISIT HI MDM: CPT

## 2022-06-12 PROCEDURE — 85025 COMPLETE CBC W/AUTO DIFF WBC: CPT

## 2022-06-12 PROCEDURE — 36415 COLL VENOUS BLD VENIPUNCTURE: CPT

## 2022-06-12 PROCEDURE — 71275 CT ANGIOGRAPHY CHEST: CPT | Mod: ME

## 2022-06-12 PROCEDURE — 87040 BLOOD CULTURE FOR BACTERIA: CPT

## 2022-06-12 PROCEDURE — 85379 FIBRIN DEGRADATION QUANT: CPT

## 2022-06-12 PROCEDURE — 83735 ASSAY OF MAGNESIUM: CPT

## 2022-06-12 PROCEDURE — 83880 ASSAY OF NATRIURETIC PEPTIDE: CPT

## 2022-06-12 PROCEDURE — 97161 PT EVAL LOW COMPLEX 20 MIN: CPT

## 2022-06-12 PROCEDURE — 93970 EXTREMITY STUDY: CPT

## 2022-06-12 PROCEDURE — 97530 THERAPEUTIC ACTIVITIES: CPT

## 2022-06-12 PROCEDURE — 71045 X-RAY EXAM CHEST 1 VIEW: CPT

## 2022-06-12 PROCEDURE — 96375 TX/PRO/DX INJ NEW DRUG ADDON: CPT

## 2022-06-12 PROCEDURE — G1004: CPT

## 2022-06-12 PROCEDURE — 80053 COMPREHEN METABOLIC PANEL: CPT

## 2022-06-12 PROCEDURE — 93005 ELECTROCARDIOGRAM TRACING: CPT

## 2022-06-12 PROCEDURE — 97116 GAIT TRAINING THERAPY: CPT

## 2022-06-12 PROCEDURE — 80048 BASIC METABOLIC PNL TOTAL CA: CPT

## 2022-06-12 PROCEDURE — 85027 COMPLETE CBC AUTOMATED: CPT

## 2022-06-12 PROCEDURE — 93306 TTE W/DOPPLER COMPLETE: CPT

## 2022-06-12 PROCEDURE — 86803 HEPATITIS C AB TEST: CPT

## 2022-06-12 PROCEDURE — 99232 SBSQ HOSP IP/OBS MODERATE 35: CPT

## 2022-06-12 PROCEDURE — 87641 MR-STAPH DNA AMP PROBE: CPT

## 2022-06-12 RX ORDER — ASPIRIN/CALCIUM CARB/MAGNESIUM 324 MG
1 TABLET ORAL
Qty: 0 | Refills: 0 | DISCHARGE
Start: 2022-06-12

## 2022-06-12 RX ORDER — SENNA PLUS 8.6 MG/1
2 TABLET ORAL
Qty: 0 | Refills: 0 | DISCHARGE
Start: 2022-06-12

## 2022-06-12 RX ORDER — BACLOFEN 100 %
1 POWDER (GRAM) MISCELLANEOUS
Qty: 0 | Refills: 0 | DISCHARGE
Start: 2022-06-12

## 2022-06-12 RX ORDER — ENOXAPARIN SODIUM 100 MG/ML
40 INJECTION SUBCUTANEOUS
Qty: 60 | Refills: 0
Start: 2022-06-12 | End: 2022-07-11

## 2022-06-12 RX ORDER — POLYETHYLENE GLYCOL 3350 17 G/17G
17 POWDER, FOR SOLUTION ORAL
Qty: 0 | Refills: 0 | DISCHARGE
Start: 2022-06-12

## 2022-06-12 RX ORDER — CEFUROXIME AXETIL 250 MG
1 TABLET ORAL
Qty: 4 | Refills: 0
Start: 2022-06-12 | End: 2022-06-13

## 2022-06-12 RX ORDER — ASPIRIN/CALCIUM CARB/MAGNESIUM 324 MG
1 TABLET ORAL
Qty: 30 | Refills: 0
Start: 2022-06-12 | End: 2022-07-11

## 2022-06-12 RX ORDER — POTASSIUM CHLORIDE 20 MEQ
40 PACKET (EA) ORAL ONCE
Refills: 0 | Status: COMPLETED | OUTPATIENT
Start: 2022-06-12 | End: 2022-06-12

## 2022-06-12 RX ADMIN — Medication 40 MILLIEQUIVALENT(S): at 12:08

## 2022-06-12 RX ADMIN — CEFTRIAXONE 100 MILLIGRAM(S): 500 INJECTION, POWDER, FOR SOLUTION INTRAMUSCULAR; INTRAVENOUS at 12:09

## 2022-06-12 RX ADMIN — Medication 0.5 MILLIGRAM(S): at 12:07

## 2022-06-12 RX ADMIN — ENOXAPARIN SODIUM 40 MILLIGRAM(S): 100 INJECTION SUBCUTANEOUS at 06:14

## 2022-06-12 RX ADMIN — Medication 40 MILLIGRAM(S): at 06:15

## 2022-06-12 RX ADMIN — BUPROPION HYDROCHLORIDE 150 MILLIGRAM(S): 150 TABLET, EXTENDED RELEASE ORAL at 12:07

## 2022-06-12 RX ADMIN — Medication 81 MILLIGRAM(S): at 12:07

## 2022-06-12 RX ADMIN — Medication 200 MILLIGRAM(S): at 06:14

## 2022-06-12 NOTE — PROGRESS NOTE ADULT - ASSESSMENT
56 yo female with PMH MM, DD, HTN, chronic back pain presenting to the ED with increased lower extremity edema and shortness of breath since past few day      LE edema   - US LE shows no evidence of DVT  - CT Angio chest negative for PE  -LE edema in setting of cellulitis lymphedema and some volume overload now sp iv lasix can continue with PO   -encouraged 1.2 liter fluid restriction  - TTE: technically difficult, normal LV & RV size and function EF 55-60%, trace MR and TR  - EKG: NSR, no acute ischemic changes noted    - Continue ASA and Lipitor    - Abx per ID   - Monitor and replete lytes, keep K>4, Mg>2.  Tonya Carvajal FNP-C  Cardiology NP  [id number] [phone]

## 2022-06-12 NOTE — DISCHARGE NOTE PROVIDER - HOSPITAL COURSE
The patient is a 57-year-old woman with PMH of MM, mild MR, HTN, Anxiety, Depression, pre-diabetes, sciatica and spinal stenosis with chronic back pain who presented to the ED with increased lower extremity edema with complaints of leg pain and shortness of breath, admitted for LE cellulitis.      This patient is being managed with:   Diet DASH/TLC-  Sodium & Cholesterol Restricted  Consistent Carbohydrate {Evening Snack}  Entered: Jun 7 2022 11:16AM     Problem/Plan - 1:  ·  Problem: Cellulitis.   ·  Plan: Suspect bilateral leg cellulitis in setting of underlying leg edema with blisters with areas of skin breakdown, new diagnosis of DM, and recent fall.  Right leg erythema has resolved, left leg erythema still present.  Will continue IV Ceftriaxone for now.  Blood cultures with no growth to date.  Continue wound care as per wound care NP recommendations.     Problem/Plan - 2:  ·  Problem: Leg edema.   ·  Plan: Bilateral leg edema, possibly worsened in setting of cellulitis.  Per patient, she has had long-standing leg edema that has been treated with Lasix, but does note that the edema has worsened over time.  Echo without evidence of heart failure.  Suspect underlying venous insufficiency in setting of obesity, DM.    -Elevate legs, possible lymphedema pump as outpatient after cellulitis has resolved.    -Patient has been counseled on benefits of weight loss and low salt diet.  -Continue Lasix, +1 dose of IV Lasix ordered by cardio today.     Problem/Plan - 3:  ·  Problem: SOB (shortness of breath).   ·  Plan: Patient presented with c/o shortness of breath with progressively worsening LE edema.  LE dopplers negative for DVT, CT Chest Angio negative for PE or aortic aneurysm with clear lungs.  No hypoxia on RA, no complaints of orthopnea and no longer complaining of SOB.  Echo without evidence of heart failure.  Continue Lasix per cardiology.     Problem/Plan - 4:  ·  Problem: Sinus tachycardia.   ·  Plan: Mild sinus tachycardia.  CTA was limited, but negative for PE (and dopplers negative DVT) and patient is receiving DVT prophylaxis.  Case d/w outpatient cardiologist who reported that resting HR was 96 when seen in the office earlier this year.  Suspect tachycardia related to obesity/deconditioning, with chronic back pain and current infection possibly contributing as well.  Continue to monitor.     Problem/Plan - 5:  ·  Problem: Multiple myeloma.   ·  Plan: Follows with oncologist, Dr. Mak Amin.  Patient reports taking Acyclovir and baby Aspirin, both prescribed by her oncologist, both being continued in hospital.  Patient also reports being due for dose of Decadron today.  Messages were left for Dr. Amin twice, but no call back yet.  Given Decadron 20mg x1 on 6/10, per prescription records from [facility].     Problem/Plan - 6:  ·  Problem: Type 2 diabetes mellitus.   ·  Plan: Previously diagnosed with pre-diabetes, now with A1C 6.6 diagnostic for DM.  Monitoring finger sticks, diabetes is diet-controlled with consistent carb diet.  On Admelog sliding scale for now as we are giving Decadron 20mg x1 today.  Nutrition consult appreciated, OT consult requested per their recommendation as patient reported some difficulties with feeding herself.  Case d/w CM and SW on 6/9, plan for rehab at discharge with possible transition to group home from rehab.     Problem/Plan - 7:  ·  Problem: HTN (hypertension).   ·  Plan: Not on home meds.  Continue Lasix as above.  Monitor BP.  Dash Diet.     Problem/Plan - 8:  ·  Problem: HLD (hyperlipidemia).   ·  Plan: Continue home atorvastatin.     Problem/Plan - 9:  ·  Problem: Chronic back pain.   ·  Plan: Continue home meds of gabapentin and PRN Baclofen.     Problem/Plan - 10:  ·  Problem: Anxiety and depression.   ·  Plan; Continue home bupropion and PRN lorazepam.     Problem/Plan - 11:  ·  Problem: DVT prophylaxis.   ·  Plan: Lovenox 40 SC BID.     The patient is a 57-year-old woman with PMH of MM, mild MR, HTN, Anxiety, Depression, pre-diabetes, sciatica and spinal stenosis with chronic back pain who presented to the ED with increased lower extremity edema with complaints of leg pain and shortness of breath, admitted for LE cellulitis.    ·  Problem: Cellulitis.   ·  Plan: Suspect bilateral leg cellulitis in setting of underlying leg edema with blisters with areas of skin breakdown, new diagnosis of DM, and recent fall.  Right leg erythema has resolved, left leg erythema still present.  Will continue cefuroxime 500mg po bid to 6/13/22 per ID recommendation   Blood cultures with no growth to date.  Continue wound care .      ·  Problem: Leg edema.   ·  Plan: Bilateral leg edema, possibly worsened in setting of cellulitis.  Per patient, she has had long-standing leg edema that has been treated with Lasix, but does note that the edema has worsened over time.  Echo without evidence of heart failure.  Suspect underlying venous insufficiency in setting of obesity, DM.    -Elevate legs, possible lymphedema pump as outpatient after cellulitis has resolved.    -Patient has been counseled on benefits of weight loss and low salt diet.  -Continue Lasix    ·  Problem: Multiple myeloma.   ·  Plan: Follows with oncologist, Dr. Mak Amin.  Patient reports taking Acyclovir and baby Aspirin, both prescribed by her oncologist, both being continued in hospital.  Follow with Hemonc Mak Amin in office     ·  Problem: Type 2 diabetes mellitus.   ·  Plan: Previously diagnosed with pre-diabetes, now with A1C 6.6 diagnostic for DM.  diabetes is diet-controlled with consistent carb diet.   sliding scale .    ·  Problem: HTN (hypertension).   ·  Plan: Not on home meds.  Continue Lasix   Monitor BP.  Dash Diet.    ·  Problem: HLD (hyperlipidemia).   ·  Plan: Continue home atorvastatin.    ·  Problem: Chronic back pain.   ·  Plan: Continue home meds of gabapentin and PRN Baclofen.  OT consult difficulties with feeding herself.  Case d/w CM and SW on 6/9, plan for rehab at discharge with possible transition to group home from rehab    ·  Problem: Anxiety and depression.   ·  Plan; Continue home bupropion and PRN lorazepam.    ·  Problem: DVT prophylaxis.   ·  Plan: Lovenox 40 SC BID.

## 2022-06-12 NOTE — DISCHARGE NOTE PROVIDER - PROVIDER TOKENS
FREE:[LAST:[Dr Amin],PHONE:[(   )    -],FAX:[(   )    -],ADDRESS:[Hematology Oncology],FOLLOWUP:[1 week],ESTABLISHEDPATIENT:[T]],PROVIDER:[TOKEN:[[id number]],FOLLOWUP:[1 week],ESTABLISHEDPATIENT:[T]]

## 2022-06-12 NOTE — DISCHARGE NOTE PROVIDER - CARE PROVIDER_API CALL
Dr Amin,   Hematology Oncology  Phone: (   )    -  Fax: (   )    -  Established Patient  Follow Up Time: 1 week    JODIE AMIN  Internal Medicine  [facility] FELICIAN[facility], [facility],    Phone: ()-  Fax: ()-  Established Patient  Follow Up Time: 1 week

## 2022-06-12 NOTE — DISCHARGE NOTE NURSING/CASE MANAGEMENT/SOCIAL WORK - NSDCPEFALRISK_GEN_ALL_CORE
For information on Fall & Injury Prevention, visit: [email or web address] OR  [email or web address] OR  https://www.cdc.gov/steadi/patient.html

## 2022-06-12 NOTE — PROGRESS NOTE ADULT - PROVIDER SPECIALTY LIST ADULT
Cardiology
Infectious Disease
Cardiology
Cardiology
Infectious Disease
Cardiology
Internal Medicine
Internal Medicine
Hospitalist
Internal Medicine
Internal Medicine

## 2022-06-12 NOTE — PROGRESS NOTE ADULT - NS ATTEND AMEND GEN_ALL_CORE FT
edema stable. cont po lasix. No signs of significant ischemia . cont current care. Further cardiac workup will depend on clinical course.

## 2022-06-12 NOTE — DISCHARGE NOTE NURSING/CASE MANAGEMENT/SOCIAL WORK - HAVE YOU HAD A SECOND COVID-19 BOOSTER?
Report received from [facility]. SBAR were discussed. Patient sitting up in chair. No complaints. She is looking forward for discharge.  
 
Cristóbal Mckeon RN 
 
 No

## 2022-06-12 NOTE — PROGRESS NOTE ADULT - SUBJECTIVE AND OBJECTIVE BOX
[facility] -- Nico Ugarte, Tanvi Edmonds Pannella, Patel, Savella  Office # [phone]      Follow Up:    htn hld le edema   Subjective/Observations:   No events overnight resting comfortably in chair  No complaints of chest pain, dyspnea, or palpitations reported. No signs of orthopnea or PND.      REVIEW OF SYSTEMS: All other review of systems is negative unless indicated above    PAST MEDICAL & SURGICAL HISTORY:  Multiple myeloma      Mild developmental delay      HTN (hypertension)      High cholesterol      Sciatica      Anxiety      Pre-diabetes      S/P removal of ovarian cyst      S/P removal of ovarian cyst      H/O basal cell carcinoma excision          MEDICATIONS  (STANDING):  acyclovir   Oral Tab/Cap 200 milliGRAM(s) Oral two times a day  aspirin enteric coated 81 milliGRAM(s) Oral daily  atorvastatin 20 milliGRAM(s) Oral at bedtime  buPROPion XL (24-Hour) . 150 milliGRAM(s) Oral daily  cefTRIAXone   IVPB 1000 milliGRAM(s) IV Intermittent every 24 hours  dextrose 5%. 1000 milliLiter(s) (50 mL/Hr) IV Continuous <Continuous>  dextrose 5%. 1000 milliLiter(s) (100 mL/Hr) IV Continuous <Continuous>  dextrose 50% Injectable 25 Gram(s) IV Push once  dextrose 50% Injectable 12.5 Gram(s) IV Push once  dextrose 50% Injectable 25 Gram(s) IV Push once  enoxaparin Injectable 40 milliGRAM(s) SubCutaneous every 12 hours  furosemide    Tablet 40 milliGRAM(s) Oral daily  gabapentin 300 milliGRAM(s) Oral at bedtime  glucagon  Injectable 1 milliGRAM(s) IntraMuscular once  insulin lispro (ADMELOG) corrective regimen sliding scale   SubCutaneous at bedtime  insulin lispro (ADMELOG) corrective regimen sliding scale   SubCutaneous three times a day before meals  montelukast 10 milliGRAM(s) Oral at bedtime  polyethylene glycol 3350 17 Gram(s) Oral daily  potassium chloride   Powder 40 milliEquivalent(s) Oral once  senna 2 Tablet(s) Oral at bedtime    MEDICATIONS  (PRN):  acetaminophen     Tablet .. 975 milliGRAM(s) Oral every 8 hours PRN Moderate Pain (4 - 6)  baclofen 20 milliGRAM(s) Oral every 12 hours PRN Muscle Spasm  dextrose Oral Gel 15 Gram(s) Oral once PRN Blood Glucose LESS THAN 70 milliGRAM(s)/deciliter  LORazepam     Tablet 0.5 milliGRAM(s) Oral daily PRN Anxiety      Allergies    fragrance (Unknown)  No Known Drug Allergies  Pollen, animal hair (Unknown)    Intolerances    Milk (Unknown)      Vital Signs Last 24 Hrs  T(C): 36.7 (12 Jun 2022 05:28), Max: 36.7 (11 Jun 2022 12:51)  T(F): 98.1 (12 Jun 2022 05:28), Max: 98.1 (11 Jun 2022 20:33)  HR: 93 (12 Jun 2022 05:28) (93 - 102)  BP: 126/80 (12 Jun 2022 05:28) (126/80 - 142/89)  BP(mean): --  RR: 19 (12 Jun 2022 05:28) (18 - 19)  SpO2: 94% (12 Jun 2022 05:28) (93% - 95%)    I&O's Summary    11 Jun 2022 07:01  -  12 Jun 2022 07:00  --------------------------------------------------------  IN: 0 mL / OUT: 550 mL / NET: -550 mL          PHYSICAL EXAM:  TELE: not on tele   Constitutional: NAD, awake and alert, obese  HEENT: Moist Mucous Membranes, Anicteric  Pulmonary: Non-labored, breath sounds are clear bilaterally, No wheezing, crackles or rhonchi  Cardiovascular: Regular, S1 and S2 nl, No murmurs, rubs, gallops or clicks  Gastrointestinal: Bowel Sounds present, soft, nontender.   Lymph+peripheral edema.  Skin: B/L LE edema, erythema, warmth and tenderness w/ active blisters    Psych:  Mood & affect appropriate    LABS: All Labs Reviewed:                        11.7   10.15 )-----------( 398      ( 12 Jun 2022 07:57 )             35.0                         11.8   10.11 )-----------( 312      ( 10 Parth 2022 06:23 )             36.9     12 Jun 2022 07:57    138    |  101    |  24     ----------------------------<  112    3.3     |  28     |  0.74   11 Jun 2022 08:19    139    |  102    |  20     ----------------------------<  127    3.7     |  29     |  0.70   10 Parth 2022 06:22    139    |  103    |  16     ----------------------------<  120    4.2     |  30     |  0.75     Ca    9.4        12 Jun 2022 07:57  Ca    9.8        11 Jun 2022 08:19  Ca    9.1        10 Jun 2022 06:22        12 Lead ECG:   Ventricular Rate 99 BPM    Atrial Rate 99 BPM    P-R Interval 134 ms    QRS Duration 84 ms    Q-T Interval 332 ms    QTC Calculation(Bazett) 426 ms    P Axis 40 degrees    R Axis 0 degrees    T Axis 29 degrees    Diagnosis Line Normal sinus rhythm  Cannot rule out Anterior infarct , age undetermined  Confirmed by GIN SALAS ([id number]) on 6/6/2022 1:13:42 PM (06-06-22 @ 11:41)    < from: Xray Chest 1 View- PORTABLE-Urgent (06.06.22 @ 13:20) >    ACC: [id number] EXAM:  XR CHEST PORTABLE URGENT 1V                          PROCEDURE DATE:  06/06/2022          INTERPRETATION:  AP supine chest on June 6, 2022 at 1:26 PM. Patient has   chest pain.    Shallow inspiration crowds the chest. Heart magnified by technique.    The aorta is prominent and possibly aneurysmal. This may be increased   from August 20, 2011.    Lungs remain grossly clear.    IMPRESSION: Prominent aorta possibly increased from prior. Consider CT   angiogram of the aorta. A note was posted on the [facility] ED discrepancy   site at 1:22 PM.    --- End of Report ---            SAM PRAJAPATI MD; Attending Radiologist  This document has been electronically signed. Jun 6 2022  1:23PM    < end of copied text >  < from: TTE Echo Complete w/o Contrast w/ Doppler (06.06.22 @ 18:08) >    ACC: [id number] EXAM:  ECHO TTE WO CON COMP W DOPP                          PROCEDURE DATE:  06/06/2022          INTERPRETATION:  INDICATION: Edema  Sonographer AS    Blood Pressure 149/79    Height 160 cm     Weight 117.9 kg       BSA 2.16   sq m    Dimensions:  LA 2.5       Normal Values: 2.0 - 4.0 cm  Ao 2.5        Normal Values: 2.0 - 3.8 cm  SEPTUM 1.1       Normal Values: 0.6 - 1.2 cm  PWT 1.0       Normal Values: 0.6 - 1.1 cm  LVIDd 4.4         Normal Values: 3.0 - 5.6 cm  LVIDs 3.0 Normal Values: 1.8 - 4.0 cm      OBSERVATIONS:  Technically difficult and limited study  Mitral Valve: normal, trace physiologic MR.  Aortic Valve/Aorta: Aortic valve is not well-visualized  Tricuspid Valve: normal with trace TR.  Pulmonic Valve: Not well-visualized  Left Atrium: normal  Right Atrium: Not well-visualized  Left Ventricle: The left ventricular endocardium is not well-visualized.   Grossly normal LV size and systolic function, estimated LVEF of 55-60%.   Cannot rule out segmental abnormalities  Right Ventricle: Grossly normal size and systolic function.  Pericardium: no significant pericardial effusion.  IVC measures 1.1 cm          IMPRESSION:  Technically difficult and limited study  The left ventricular endocardium is not well-visualized. Grossly normal   LV size and systolic function, estimated LVEF of 55-60%. Cannot rule out   segmental abnormalities  Grossly normal RV size and systolic function.  The aortic valve is not well-visualized  Trace physiologic MR and TR.    --- End of Report ---

## 2022-06-12 NOTE — PROGRESS NOTE ADULT - REASON FOR ADMISSION
LE Cellulitis

## 2022-06-12 NOTE — DISCHARGE NOTE PROVIDER - NSDCCPCAREPLAN_GEN_ALL_CORE_FT
PRINCIPAL DISCHARGE DIAGNOSIS  Diagnosis: Lower extremity edema  Assessment and Plan of Treatment:       SECONDARY DISCHARGE DIAGNOSES  Diagnosis: SOB (shortness of breath)  Assessment and Plan of Treatment:      PRINCIPAL DISCHARGE DIAGNOSIS  Diagnosis: Lower extremity edema  Assessment and Plan of Treatment: due to overweight obesity, venous insufficiency, Cellulitis .  Loose weight via exercise and diet.  Complete oral antibiotics as prescribed. Elevate leg during sitting and sleeping . Take lasix as prescribed      SECONDARY DISCHARGE DIAGNOSES  Diagnosis: SOB (shortness of breath)  Assessment and Plan of Treatment: Take lasix as prescribed

## 2022-06-12 NOTE — DISCHARGE NOTE PROVIDER - DETAILS OF MALNUTRITION DIAGNOSIS/DIAGNOSES
This patient has been assessed with a concern for Malnutrition and was treated during this hospitalization for the following Nutrition diagnosis/diagnoses:     -  06/08/2022: Morbid obesity (BMI > 40)

## 2022-06-12 NOTE — DISCHARGE NOTE NURSING/CASE MANAGEMENT/SOCIAL WORK - PATIENT PORTAL LINK FT
You can access the FollowMyHealth Patient Portal offered by [facility] by registering at the following website: [email or web address]. By joining [employer]’s FollowMyHealth portal, you will also be able to view your health information using other applications (apps) compatible with our system.

## 2022-10-21 PROBLEM — F41.9 ANXIETY DISORDER, UNSPECIFIED: Chronic | Status: ACTIVE | Noted: 2022-06-06

## 2022-10-21 PROBLEM — R73.03 PREDIABETES: Chronic | Status: ACTIVE | Noted: 2022-06-06

## 2022-10-21 PROBLEM — M54.30 SCIATICA, UNSPECIFIED SIDE: Chronic | Status: ACTIVE | Noted: 2022-06-06

## 2022-11-04 ENCOUNTER — APPOINTMENT (OUTPATIENT)
Dept: DERMATOLOGY | Facility: CLINIC | Age: 57
End: 2022-11-04

## 2022-11-17 ENCOUNTER — APPOINTMENT (OUTPATIENT)
Dept: DERMATOLOGY | Facility: CLINIC | Age: 57
End: 2022-11-17

## 2022-11-17 DIAGNOSIS — L71.8 OTHER ROSACEA: ICD-10-CM

## 2022-11-17 DIAGNOSIS — L30.9 DERMATITIS, UNSPECIFIED: ICD-10-CM

## 2022-11-17 PROCEDURE — 99213 OFFICE O/P EST LOW 20 MIN: CPT

## 2022-11-17 RX ORDER — TRIAMCINOLONE ACETONIDE 1 MG/G
0.1 CREAM TOPICAL
Qty: 1 | Refills: 1 | Status: ACTIVE | COMMUNITY
Start: 2022-11-17 | End: 1900-01-01

## 2022-11-17 NOTE — PHYSICAL EXAM
[Alert] : alert [Oriented x 3] : ~L oriented x 3 [FreeTextEntry3] : Focused exam performed. Findings notable for:\par Telangiectasias and background erythema of nose\par Erythematous scaly plaques on L dorsal foot\par Well healed scar on L dorsal foot

## 2022-11-17 NOTE — ASSESSMENT
[FreeTextEntry1] : 1. Rosacea\par - Metrocream 0.75% BID\par \par 2. Eczematous dermatitis on L dorsal foot\par - START triamcinolone 0.1% cream, applied to affected area BID; no more than 2 weeks at a time\par - SED including atrophy, dyspigmentation, telangiectasias, and striae\par - Proper use reviewed including use only in recommended sites and avoidance of sustained and prolonged use\par - Continue compression stockings\par \par 3. Hx of NMSC (BCC x3 > 10 years ago treated with Mohs)\par - Next TBSE March 2023\par

## 2022-11-17 NOTE — HISTORY OF PRESENT ILLNESS
[FreeTextEntry1] : Follow up [de-identified] : PCP: MARILEE LARA\par \par HOANG MICHEL is a 57 year F who presents for:\par \par Rosacea follow up - overall well controlled. she has been in rehab and now a group home so she is not using topical treatments now.\par Wound on L foot - due to trauma. has seen wound care clinic with healing of wound. there is a rash above the wound. she is getting compression stockings today.\par She has a hx of multiple myeloma.\par Her last since check was March 2022.\par \par Derm Hx: seb derm, EIC, lipomas\par Personal hx of skin cancer: BCC x 3 on the face (nose, left cutaneous lip, and chin) s/p Mohs for each >10 yrs ago\par FHx of skin cancer: denies\par Social Hx: Lives in group home now (previously in rehab)\par

## 2022-12-16 ENCOUNTER — NON-APPOINTMENT (OUTPATIENT)
Age: 57
End: 2022-12-16

## 2023-03-15 ENCOUNTER — APPOINTMENT (OUTPATIENT)
Dept: DERMATOLOGY | Facility: CLINIC | Age: 58
End: 2023-03-15
Payer: MEDICARE

## 2023-03-15 PROCEDURE — 99214 OFFICE O/P EST MOD 30 MIN: CPT

## 2023-03-15 RX ORDER — DESONIDE 0.5 MG/G
0.05 CREAM TOPICAL
Qty: 1 | Refills: 1 | Status: ACTIVE | COMMUNITY
Start: 2023-03-15 | End: 1900-01-01

## 2023-05-17 ENCOUNTER — APPOINTMENT (OUTPATIENT)
Dept: DERMATOLOGY | Facility: CLINIC | Age: 58
End: 2023-05-17
Payer: MEDICARE

## 2023-05-17 PROCEDURE — 99214 OFFICE O/P EST MOD 30 MIN: CPT

## 2023-05-20 ENCOUNTER — NON-APPOINTMENT (OUTPATIENT)
Age: 58
End: 2023-05-20

## 2023-07-28 RX ORDER — METRONIDAZOLE 7.5 MG/G
0.75 CREAM TOPICAL
Qty: 1 | Refills: 3 | Status: ACTIVE | COMMUNITY
Start: 2022-03-10 | End: 1900-01-01

## 2023-07-28 RX ORDER — KETOCONAZOLE 20.5 MG/ML
2 SHAMPOO, SUSPENSION TOPICAL
Qty: 1 | Refills: 3 | Status: ACTIVE | COMMUNITY
Start: 2023-05-17 | End: 1900-01-01

## 2023-07-28 RX ORDER — KETOCONAZOLE 20 MG/G
2 CREAM TOPICAL
Qty: 1 | Refills: 3 | Status: ACTIVE | COMMUNITY
Start: 2023-03-15 | End: 1900-01-01

## 2023-10-30 ENCOUNTER — APPOINTMENT (OUTPATIENT)
Dept: DERMATOLOGY | Facility: CLINIC | Age: 58
End: 2023-10-30

## 2023-11-15 ENCOUNTER — APPOINTMENT (OUTPATIENT)
Dept: DERMATOLOGY | Facility: CLINIC | Age: 58
End: 2023-11-15
Payer: MEDICARE

## 2023-11-15 VITALS — WEIGHT: 178.9 LBS | BODY MASS INDEX: 31.69 KG/M2

## 2023-11-15 DIAGNOSIS — L21.9 SEBORRHEIC DERMATITIS, UNSPECIFIED: ICD-10-CM

## 2023-11-15 PROCEDURE — 99214 OFFICE O/P EST MOD 30 MIN: CPT

## 2023-11-15 RX ORDER — METRONIDAZOLE 7.5 MG/G
0.75 CREAM TOPICAL
Qty: 1 | Refills: 11 | Status: ACTIVE | COMMUNITY
Start: 2023-11-15 | End: 1900-01-01

## 2023-11-15 RX ORDER — KETOCONAZOLE 20.5 MG/ML
2 SHAMPOO, SUSPENSION TOPICAL
Qty: 1 | Refills: 11 | Status: ACTIVE | COMMUNITY
Start: 2023-11-15 | End: 1900-01-01

## 2024-11-08 ENCOUNTER — APPOINTMENT (OUTPATIENT)
Dept: DERMATOLOGY | Facility: CLINIC | Age: 59
End: 2024-11-08

## 2024-11-18 ENCOUNTER — APPOINTMENT (OUTPATIENT)
Dept: DERMATOLOGY | Facility: CLINIC | Age: 59
End: 2024-11-18
Payer: MEDICARE

## 2024-11-18 DIAGNOSIS — D48.9 NEOPLASM OF UNCERTAIN BEHAVIOR, UNSPECIFIED: ICD-10-CM

## 2024-11-18 DIAGNOSIS — Z85.828 PERSONAL HISTORY OF OTHER MALIGNANT NEOPLASM OF SKIN: ICD-10-CM

## 2024-11-18 PROCEDURE — 11104 PUNCH BX SKIN SINGLE LESION: CPT

## 2024-11-18 PROCEDURE — 99213 OFFICE O/P EST LOW 20 MIN: CPT | Mod: 25

## 2024-11-19 NOTE — ED PROVIDER NOTE - CONSTITUTIONAL NEGATIVE STATEMENT, MLM
no fever and no chills. Urine Pregnancy Test Result: negative Performed By: Anny Doll Detail Level: None

## 2024-11-22 LAB — DERMATOLOGY BIOPSY: NORMAL

## 2024-11-23 NOTE — PROGRESS NOTE ADULT - PROBLEM SELECTOR PROBLEM 5
Family Medicine History and Physical  [facility]  Patient Name: Nany Krishnamurthy   Date: 11/22/24    SUBJECTIVE:     Chief Complaint:  Chief Complaint   Patient presents with    Fall    Head Injury With Unknown LOC     History Of Present Illness  Patient is a 89-year-old female with past medical history of hypertension, hyperlipidemia, vascular dementia, type 2 diabetes diet-controlled, DDD, urinary incontinence, and frequent falls who presents to the ED after a recent fall. Patient fell after losing balance and hit her head on the right side.  She did not have any loss of consciousness. Denies any dizziness, lightheadedness, chest palpitations prior to the episode. Patient lives at home with her daughter who was present at the time when patient fell and was able to help her get up as soon as she fell. At baseline patient is A&O x 2 given her dementia. Family does complain that patient has been having frequent falls recently.     Patient reports compliance to HTN regimen of Lisinopril 40 mg BID (Per outpatient notes, ordered as daily). Reports home blood pressure is typically SBP of 150-160.     Denies any  headaches, vision changes, cough, SOB, chest pain, palpitations, nausea, vomiting, diarrhea, constipation, urinary urgency, dysuria, abnormal bleeding, abnormal moles, rashes, fevers, or chills. Patient denies any head pain at this time.    Social Hx: Lives at home alone with help from daughter.    ED Course:    Labs:  141 107 11 98   3.2 29 0.51      14.0 11.7 261    35.1      Troponins: 138 > 129 > 134 > 143 > 149    Imaging:   -EKG: NSR  -CT head and cervical spine: No acute intracranial process.  Chronic ischemic changes age-appropriate, volume loss.  Scalp contusion.  Calcific atherosclerotic changes  -TTE: 54% ejection fraction, G2DD    Interventions: Aspirin 325 mg, subq heparin, potassium chloride 40 mg       Past Medical History  Past Medical History:   Diagnosis Date    Cataract      High cholesterol     History of bilateral cataract extraction 08/04/2020    Urinary incontinence        Surgical History  Past Surgical History:   Procedure Laterality Date    Cataract extrac w/ intraocular lens imp&ant vit,bilaterl Bilateral 2005        Allergies  ALLERGIES:  Patient has no known allergies.    Home Medications  Medications Prior to Admission   Medication Sig Dispense Refill    Vibegron (Gemtesa) 75 MG Tab TAKE 75 MG BY MOUTH DAILY. 90 tablet 3    lisinopril (ZESTRIL) 40 MG tablet TAKE 1 TABLET BY MOUTH EVERY DAY 90 tablet 1    memantine (NAMENDA) 5 MG tablet Take 2 tablets by mouth in the morning and 2 tablets in the evening. 180 tablet 3    donepezil (ARICEPT) 10 MG tablet Take 1 tablet by mouth nightly. 90 tablet 3    atorvastatin (LIPITOR) 10 MG tablet Take 1 tablet by mouth daily.      dorzolamide (TRUSOPT) 2 % ophthalmic solution Place 1 drop into both eyes in the morning and 1 drop in the evening.      diclofenac (VOLTAREN) 1 % gel Apply 2-4 g topically every 6 hours as needed (pain). 100 g 0    latanoprost (XALATAN) 0.005 % ophthalmic solution INSTILL 1 DROP IN BOTH EYES AT BEDTIME      Blood Pressure Monitoring (Blood Pressure Cuff) Misc Check blood pressure daily 2 hours after taking blood pressure medications 1 each 0       Inpatient Medications  Current Facility-Administered Medications   Medication    sodium chloride 0.9 % injection 10 mL    sodium chloride 0.9 % injection 2 mL    sodium chloride (NORMAL SALINE) 0.9 % bolus 500 mL    heparin (porcine) injection 5,000 Units    polyethylene glycol (MIRALAX) packet 17 g    melatonin tablet 3 mg    donepezil (ARICEPT) tablet 10 mg    lisinopril (ZESTRIL) tablet 40 mg    memantine (NAMENDA) tablet 10 mg    mirabegron ER (MYRBETRIQ) 24 hour tablet 25 mg    niCARdipine (CARDENE) 40 mg/200 mL in NaCl infusion       Social History  Social History     Tobacco Use    Smoking status: Never    Smokeless tobacco: Never   Vaping Use    Vaping status:  never used   Substance Use Topics    Alcohol use: Not Currently    Drug use: Not Currently       Family History    Family History   Problem Relation Age of Onset    Hypertension Daughter     Cancer, Prostate Son        OBJECTIVE:     Vitals  Visit Vitals  BP (!) 211/92   Pulse 64   Temp 97.7 °F (36.5 °C) (Oral)   Resp 16   Ht 5' 3\" (1.6 m)   Wt 73.3 kg (161 lb 9.6 oz)   SpO2 96%   BMI 28.63 kg/m²       Physical Exam  Constitutional:       Appearance: Normal appearance. She is normal weight. She is not ill-appearing.   HENT:      Head: Normocephalic and atraumatic.      Comments: Tenderness to R forehead     Right Ear: External ear normal.      Left Ear: External ear normal.      Nose: Nose normal.      Mouth/Throat:      Mouth: Mucous membranes are moist.      Pharynx: Oropharynx is clear.   Eyes:      General: No scleral icterus.     Extraocular Movements: Extraocular movements intact.      Conjunctiva/sclera: Conjunctivae normal.      Pupils: Pupils are equal, round, and reactive to light.   Cardiovascular:      Rate and Rhythm: Normal rate and regular rhythm.      Pulses: Normal pulses.      Heart sounds: Normal heart sounds. No murmur heard.  Pulmonary:      Effort: Pulmonary effort is normal. No respiratory distress.      Breath sounds: Normal breath sounds. No wheezing.   Abdominal:      General: Abdomen is flat. Bowel sounds are normal. There is no distension.      Palpations: Abdomen is soft.      Tenderness: There is no abdominal tenderness.   Musculoskeletal:         General: Normal range of motion.      Cervical back: Normal range of motion. No tenderness.      Right lower leg: Edema (Trace) present.      Left lower leg: Edema (Trace) present.      Comments: Mild tenderness to L lower back   Skin:     General: Skin is warm.      Capillary Refill: Capillary refill takes less than 2 seconds.      Findings: No bruising.   Neurological:      General: No focal deficit present.      Mental Status: She is alert.  Mental status is at baseline. She is disoriented.      Cranial Nerves: No cranial nerve deficit.      Sensory: No sensory deficit.      Motor: No weakness.      Deep Tendon Reflexes: Reflexes normal.      Comments: A&Ox2   Psychiatric:         Mood and Affect: Mood normal.         Behavior: Behavior normal.         Thought Content: Thought content normal.       Labs     Recent Results (from the past 24 hour(s))   Electrocardiogram 12-Lead    Collection Time: 11/21/24  9:17 PM   Result Value Ref Range    Ventricular Rate EKG/Min (BPM) 86     Atrial Rate (BPM) 86     KS-Interval (MSEC) 172     QRS-Interval (MSEC) 86     QT-Interval (MSEC) 380     QTc 454     P Axis (Degrees) 44     R Axis (Degrees) -25     T Axis (Degrees) 68     REPORT TEXT       Normal sinus rhythm  Possible  Left atrial enlargement  Left ventricular hypertrophy  (  R in aVL  ,  Luca product  )  Possible  Anteroseptal infarct  , age undetermined  Abnormal ECG  No previous ECGs available  Confirmed by GENEVIEVE DACOSTA DO ([id number]) on 11/22/2024 11:52:07 AM     Comprehensive Metabolic Panel    Collection Time: 11/21/24  9:33 PM    Specimen: Blood, Venous   Result Value Ref Range    Fasting Status      Sodium 138 135 - 145 mmol/L    Potassium 3.2 (L) 3.4 - 5.1 mmol/L    Chloride 105 97 - 110 mmol/L    Carbon Dioxide 25 21 - 32 mmol/L    Anion Gap 11 7 - 19 mmol/L    Glucose 137 (H) 70 - 99 mg/dL    BUN 15 6 - 20 mg/dL    Creatinine 0.54 0.51 - 0.95 mg/dL    Glomerular Filtration Rate 87 >=60    BUN/Cr 28 (H) 7 - 25    Calcium 9.0 8.4 - 10.2 mg/dL    Bilirubin, Total 0.3 0.2 - 1.0 mg/dL    GOT/AST 28 <=37 Units/L    GPT/ALT 18 <64 Units/L    Alkaline Phosphatase 104 45 - 117 Units/L    Albumin 3.4 3.4 - 5.0 g/dL    Protein, Total 8.2 6.4 - 8.2 g/dL    Globulin 4.8 (H) 2.0 - 4.0 g/dL    A/G Ratio 0.7 (L) 1.0 - 2.4   TROPONIN I, HIGH SENSITIVITY    Collection Time: 11/21/24  9:33 PM    Specimen: Blood, Venous   Result Value Ref Range    Troponin I, High  Sensitivity 138 (HH) <52 ng/L   CBC with Automated Differential (performable only)    Collection Time: 11/21/24  9:33 PM    Specimen: Blood, Venous   Result Value Ref Range    WBC 14.9 (H) 4.2 - 11.0 K/mcL    RBC 4.38 4.00 - 5.20 mil/mcL    HGB 12.6 12.0 - 15.5 g/dL    HCT 37.5 36.0 - 46.5 %    MCV 85.6 78.0 - 100.0 fl    MCH 28.8 26.0 - 34.0 pg    MCHC 33.6 32.0 - 36.5 g/dL    RDW-CV 14.0 11.0 - 15.0 %    RDW-SD 43.8 39.0 - 50.0 fL     140 - 450 K/mcL    NRBC 0 <=0 /100 WBC    Neutrophil, Percent 70 %    Lymphocytes, Percent 20 %    Mono, Percent 8 %    Eosinophils, Percent 1 %    Basophils, Percent 0 %    Immature Granulocytes 1 %    Absolute Neutrophils 10.6 (H) 1.8 - 7.7 K/mcL    Absolute Lymphocytes 2.9 1.0 - 4.0 K/mcL    Absolute Monocytes 1.1 (H) 0.3 - 0.9 K/mcL    Absolute Eosinophils  0.1 0.0 - 0.5 K/mcL    Absolute Basophils 0.1 0.0 - 0.3 K/mcL    Absolute Immature Granulocytes 0.1 0.0 - 0.2 K/mcL   Gold Top    Collection Time: 11/21/24  9:33 PM    Specimen: Blood, Venous   Result Value Ref Range    Extra Tube Hold for Add Ons    Light Blue Top    Collection Time: 11/21/24  9:33 PM    Specimen: Blood, Venous   Result Value Ref Range    Extra Tube Hold for Add Ons    Electrocardiogram 12-Lead    Collection Time: 11/21/24 10:38 PM   Result Value Ref Range    Ventricular Rate EKG/Min (BPM) 74     Atrial Rate (BPM) 74     ME-Interval (MSEC) 174     QRS-Interval (MSEC) 86     QT-Interval (MSEC) 412     QTc 457     P Axis (Degrees) 61     R Axis (Degrees) -31     T Axis (Degrees) 58     REPORT TEXT       Normal sinus rhythm  Left axis deviation  Abnormal ECG  When compared with ECG of  21-NOV-2024 21:17,  Borderline criteria for  Anteroseptal infarct  are no longer  present  Confirmed by GENEVIEVE DACOSTA DO ([id number]) on 11/22/2024 11:48:29 AM     Pink Top Tube    Collection Time: 11/21/24 10:41 PM    Specimen: Blood, Venous   Result Value Ref Range    Extra Tube Hold for Add Ons    TROPONIN I, HIGH  SENSITIVITY    Collection Time: 11/21/24 10:46 PM    Specimen: Blood, Venous   Result Value Ref Range    Troponin I, High Sensitivity 129 (HH) <52 ng/L   Light Blue Top    Collection Time: 11/21/24 10:46 PM    Specimen: Blood, Venous   Result Value Ref Range    Extra Tube Hold for Add Ons    Gold Top    Collection Time: 11/21/24 10:46 PM    Specimen: Blood, Venous   Result Value Ref Range    Extra Tube Hold for Add Ons    Pink Top Tube    Collection Time: 11/21/24 10:46 PM    Specimen: Blood, Venous   Result Value Ref Range    Extra Tube Hold for Add Ons    TROPONIN I, HIGH SENSITIVITY    Collection Time: 11/22/24  6:08 AM    Specimen: Blood, Venous   Result Value Ref Range    Troponin I, High Sensitivity 134 (HH) <52 ng/L   Magnesium    Collection Time: 11/22/24  6:08 AM    Specimen: Blood, Venous   Result Value Ref Range    Magnesium 1.8 1.7 - 2.4 mg/dL   Comprehensive Metabolic Panel    Collection Time: 11/22/24  6:08 AM    Specimen: Blood, Venous   Result Value Ref Range    Fasting Status      Sodium 141 135 - 145 mmol/L    Potassium 3.1 (L) 3.4 - 5.1 mmol/L    Chloride 107 97 - 110 mmol/L    Carbon Dioxide 29 21 - 32 mmol/L    Anion Gap 8 7 - 19 mmol/L    Glucose 98 70 - 99 mg/dL    BUN 11 6 - 20 mg/dL    Creatinine 0.51 0.51 - 0.95 mg/dL    Glomerular Filtration Rate 89 >=60    BUN/Cr 22 7 - 25    Calcium 9.1 8.4 - 10.2 mg/dL    Bilirubin, Total 0.3 0.2 - 1.0 mg/dL    GOT/AST 22 <=37 Units/L    GPT/ALT 16 <64 Units/L    Alkaline Phosphatase 82 45 - 117 Units/L    Albumin 2.8 (L) 3.4 - 5.0 g/dL    Protein, Total 7.2 6.4 - 8.2 g/dL    Globulin 4.4 (H) 2.0 - 4.0 g/dL    A/G Ratio 0.6 (L) 1.0 - 2.4   CBC with Automated Differential (performable only)    Collection Time: 11/22/24  6:08 AM    Specimen: Blood, Venous   Result Value Ref Range    WBC 14.0 (H) 4.2 - 11.0 K/mcL    RBC 4.05 4.00 - 5.20 mil/mcL    HGB 11.7 (L) 12.0 - 15.5 g/dL    HCT 35.1 (L) 36.0 - 46.5 %    MCV 86.7 78.0 - 100.0 fl    MCH 28.9 26.0 -  34.0 pg    MCHC 33.3 32.0 - 36.5 g/dL    RDW-CV 14.2 11.0 - 15.0 %    RDW-SD 45.3 39.0 - 50.0 fL     140 - 450 K/mcL    NRBC 0 <=0 /100 WBC    Neutrophil, Percent 68 %    Lymphocytes, Percent 22 %    Mono, Percent 9 %    Eosinophils, Percent 1 %    Basophils, Percent 0 %    Immature Granulocytes 0 %    Absolute Neutrophils 9.4 (H) 1.8 - 7.7 K/mcL    Absolute Lymphocytes 3.0 1.0 - 4.0 K/mcL    Absolute Monocytes 1.3 (H) 0.3 - 0.9 K/mcL    Absolute Eosinophils  0.2 0.0 - 0.5 K/mcL    Absolute Basophils 0.1 0.0 - 0.3 K/mcL    Absolute Immature Granulocytes 0.1 0.0 - 0.2 K/mcL   Potassium    Collection Time: 11/22/24 10:35 AM    Specimen: Blood, Venous   Result Value Ref Range    Potassium 3.2 (L) 3.4 - 5.1 mmol/L   TROPONIN I, HIGH SENSITIVITY    Collection Time: 11/22/24 10:35 AM    Specimen: Blood, Venous   Result Value Ref Range    Troponin I, High Sensitivity 143 (HH) <52 ng/L   TROPONIN I, HIGH SENSITIVITY    Collection Time: 11/22/24  3:15 PM    Specimen: Blood, Venous   Result Value Ref Range    Troponin I, High Sensitivity 149 (HH) <52 ng/L       Microbiology Results       None            ASSESSMENT / PLAN:     This is a 89-year-old female with past medical history of hypertension, hyperlipidemia, vascular dementia, type 2 diabetes diet-controlled, DDD, urinary incontinence, and frequent falls who presents for evaluation after mechanical fall. Currently admitted for hypertensive emergency. Intensivist and cardiology on consult.    #Hypertensive Emergency  #Troponemia  -In the ED was found to be hypertensive with SBP in 200s  -Patient on home lisinopril 40 mg BID  -s/p aspirin 324 mg  -s/p x1 dose of Lisinopril 40 mg   -Troponins: 138 > 129 > 134 > 143 > 149  -EKG: NSR  -TTE: 54% ejection fraction, G2DD  Plan:  -Intensivist on consult  -Patient in the medical stepdown unit  -Cardiology on consult  -Patient on Cardene drip to keep -170  -No indication for heparin   -Start heparin if any ST  changes  -Troponins every 4 hours  -Recommended against the usage of Hydralazine PRN due to risk of complications due to age.   -Continue lisinopril daily once Cardene is stopped.   -Consider outpatient optimization of HTN medications.   -Continue monitor blood pressures    #Mechanical Fall likely due to physical deconditioning  -CT head and cervical spine: No acute intracranial process. Chronic ischemic changes age-appropriate, volume loss. Scalp contusion. Calcific atherosclerotic changes  -Neuroexam normal  Plan:  -PT and OT on consult  -On fall risk    #Vascular Dementia  -On home donepezil 20 mg and memantine 5 mg daily  Plan:  -Continue donepezil 20 mg daily and memantine 5 mg daily    #Urinary Incontinence  -On home Vibegron 75 mg qD  Plan:  -Continue mirabegron 25 mg daily    Fluids: NSIV  Electrolytes: Replete PRN  Diet: Cardiac Diet  DVT Ppx: subQ Heparin  GI Ppx: None    Dispo: Pending Cardiology and Intensivist recs. Management of Hypertensive Emergency.     Primary Care Physician  Sarika Silva DO    Code Status    Code Status: Full Resuscitation    Plan of care was discussed with attending physician, Dr. Rylee Cartwright,    Family Medicine PGY-1  [employer]:     Senior Addendum:   Nany Krishnamurthy is a 89-year-old female with past medical history of hypertension, hyperlipidemia, vascular dementia, type 2 diabetes diet-controlled, DDD, urinary incontinence, and frequent falls who presents for evaluation after mechanical fall. Currently admitted for hypertensive emergency. Intensivist and cardiology on consult.  For patient's mechanical fall, a CT head and cervical spine were both completed which showed no acute intracranial process, scalp contusion was present.  There are chronic ischemic changes and calcific atherosclerotic changes.  Patient had physical therapy and Occupational Therapy consulted.  Given patient's elevated systolic blood pressures greater than 200, intensivist and  cardiology team were consulted for Hypertensive Emergency.  Patient was started on a Cardene drip and transferred to the MSDU.  Current providers include a systolic blood pressure of 160-170 to avoid a greater than 25% drop in systolic blood pressure.  Patient has tropenemia that is likely secondary to hypertensive emergency, low concern for ACS at this time.  Will continue to trend Troponin. Patient's EKG demonstrated normal sinus rhythm with possible left atrial enlargement and left ventricular hypertrophy.  Patient does denies any chest pain or shortness of breath at this time.  Final disposition pending improvement of hypertensive emergency and further recommendations from physical therapy and Occupational Therapy.    I have seen and examined the patient at the bedside. I discussed the plan with the intern. Patient's chart, labs, imaging, and medications reviewed. I agree with the intern's note with any changes or additions noted below. I discussed the plan with the patient at the bedside, and they expressed understanding with the assessment and agree with the plan. Patient's questions were answered.     I also reviewed the assessment and plan and updated the documentation as needed. I also staffed with the supervising attending who also is in agreement with the recommendations mentioned above. Should additional changes to the assessment and plan occur after signature of the note they will be in an addendum.       Gagandeep Francis MD  PGY-2  Family Medicine  Or *Contact \"[facility]\" via [employer] rather than the author of this note   Multiple myeloma

## 2024-12-02 ENCOUNTER — APPOINTMENT (OUTPATIENT)
Dept: DERMATOLOGY | Facility: CLINIC | Age: 59
End: 2024-12-02
Payer: MEDICARE

## 2024-12-02 DIAGNOSIS — L82.1 OTHER SEBORRHEIC KERATOSIS: ICD-10-CM

## 2024-12-02 DIAGNOSIS — Z48.02 ENCOUNTER FOR REMOVAL OF SUTURES: ICD-10-CM

## 2024-12-02 DIAGNOSIS — L85.3 XEROSIS CUTIS: ICD-10-CM

## 2024-12-02 DIAGNOSIS — D22.9 MELANOCYTIC NEVI, UNSPECIFIED: ICD-10-CM

## 2024-12-02 DIAGNOSIS — L72.0 EPIDERMAL CYST: ICD-10-CM

## 2024-12-02 DIAGNOSIS — Z51.89 ENCOUNTER FOR OTHER SPECIFIED AFTERCARE: ICD-10-CM

## 2024-12-02 DIAGNOSIS — L81.4 OTHER MELANIN HYPERPIGMENTATION: ICD-10-CM

## 2024-12-02 PROCEDURE — 99214 OFFICE O/P EST MOD 30 MIN: CPT

## 2025-02-06 ENCOUNTER — APPOINTMENT (OUTPATIENT)
Dept: DERMATOLOGY | Facility: CLINIC | Age: 60
End: 2025-02-06
Payer: MEDICARE

## 2025-02-06 PROCEDURE — 99214 OFFICE O/P EST MOD 30 MIN: CPT

## 2025-04-09 NOTE — ED PROVIDER NOTE - OBJECTIVE STATEMENT
[FreeTextEntry8] : see HPI
[FreeTextEntry8] : see HPI
56 y/o female with PMHx MM, DD, HTN presents today c/o acute on chronic back pain. pt reports lifting something heavy. Describes pain as aching, non-radiating, worsened with movement, and currently 9/10. pt denies N/V/D, abd pain, cp, sob, numbness/weakness, incontinence, hematuria, dysuria, or any other complaints.

## 2025-04-24 NOTE — PROGRESS NOTE ADULT - PROBLEM SELECTOR PLAN 3
[facility]   Chief Complaint   Patient presents with    Flu Symptoms     Presents to ED with c/o fever, bodyaches, chills, cough and congestion since Saturday. Has been taking a sinus medication and ibuprofen Q4 hours. Called EMS because she had an episode of chest pain tonight, unsure if she was coughing at the time or not.        HPI  Patient is a 38-year-old female presenting to the ED today for fever, chills, body aches, cough, and congestion.  Patient states that her symptoms have been ongoing for the past 4 days.  Tonight while sitting on her couch, she felt like she was having some chest pain with deep breaths, so she called EMS to bring her to the ED for further evaluation.  At this time, patient's chest pain has resolved.  She denies any chest pain or shortness of breath currently.  Patient has been taking DayQuil and ibuprofen for her symptoms.  Patient denies any chance of pregnancy.      Patient History   Medical History[1]  Surgical History[2]  Family History[3]  Social History[4]    Physical Exam   ED Triage Vitals [04/23/25 2238]   Temperature Heart Rate Respirations BP   (!) 38.6 °C (101.5 °F) (!) 101 20 (!) 147/104      Pulse Ox Temp Source Heart Rate Source Patient Position   99 % Temporal Monitor Sitting      BP Location FiO2 (%)     Left arm --       Physical Exam  Vitals and nursing note reviewed.   Constitutional:       General: She is not in acute distress.  HENT:      Head: Normocephalic.      Mouth/Throat:      Mouth: Mucous membranes are moist.   Eyes:      Extraocular Movements: Extraocular movements intact.      Conjunctiva/sclera: Conjunctivae normal.   Cardiovascular:      Rate and Rhythm: Normal rate and regular rhythm.   Pulmonary:      Effort: Pulmonary effort is normal. No respiratory distress.      Breath sounds: Normal breath sounds. No wheezing.   Abdominal:      General: There is no distension.      Palpations: Abdomen is soft.      Tenderness: There is no abdominal tenderness.    Musculoskeletal:         General: No swelling.      Cervical back: Neck supple.   Skin:     General: Skin is warm and dry.      Capillary Refill: Capillary refill takes less than 2 seconds.   Neurological:      General: No focal deficit present.      Mental Status: She is alert. Mental status is at baseline.           ED Course & MDM   ED Course as of 04/24/25 0620   u Apr 24, 2025   0027 EKG obtained at 2311, interpreted by myself.  Normal sinus rhythm with a ventricular rate of 100, no axis deviation, normal intervals, with no acute ischemic changes [VT]      ED Course User Index  [VT] Cata RUFFIN MD         Diagnoses as of 04/24/25 0620   Pneumonia of left lower lobe due to infectious organism   Hypokalemia             No data recorded     [place] Coma Scale Score: 15 (04/23/25 2243 : Irma Ledbetter RN)                         Medical Decision Making  Patient was seen and evaluated for chest pain and shortness of breath.  Differential diagnosis includes but is not limited to pneumonia, pneumothorax, COPD exacerbation, CHF exacerbation, PE, ACS, URI, Viral illness, Anemia, Electrolyte abnormality.  On arrival, patient is oxygenating well on room air.   Peripheral IV is established and patient is administered Toradol for her symptoms.  Additional labs and imaging are ordered for further evaluation.    CBC is unremarkable.  CMP shows hypokalemia with potassium of 2.9.  This is replaced with 40 mill equivalents KCl orally as well as 40 mill equivalents KCl IV.  Magnesium is normal at 1.86.  High-sensitivity troponin is normal at 3.  Influenza and COVID swabs are negative.    XR chest 2 views   Final Result   Significant pneumonia left lower lobe.  Recommend follow-up images to   document resolution following treatment.   Signed by Jefferson Klein DO        On reevaluation, patient is resting comfortably in bed.  She has continued to oxygenate well on room air, and remains hemodynamically stable.  Patient  will be started on a course of doxycycline for her pneumonia.  She is administered an initial IV dose here in the ED. Patient was informed of their lab and imaging results, and all questions and concerns were answered. Discharge planning with close outpatient follow-up was discussed at this time, to which the patient was agreeable. Strict return precautions were given, and patient was discharged home in stable condition.      Procedure  Procedures       [1] No past medical history on file.  [2]   Past Surgical History:  Procedure Laterality Date    OTHER SURGICAL HISTORY  09/18/2019    Oral surgery   [3] No family history on file.  [4]   Social History  Tobacco Use    Smoking status: Never    Smokeless tobacco: Never   Vaping Use    Vaping status: Never Used   Substance Use Topics    Alcohol use: Never    Drug use: Never        Cata RUFFIN MD  04/24/25 0633     Patient with shortness of breath with progressively worsening LE edema.  D-dimer elevated to 723.  LE dopplers negative for DVT, CT Chest Angio negative for PE or aortic aneurysm with clear lungs.  Continue Lasix 40mg IV daily as above.  Await echo.

## 2025-06-21 NOTE — PROGRESS NOTE ADULT - NS ATTEND AMEND GEN_ALL_CORE FT
Pt seen and examined, agree with above Suspect secondary to shock (RESOLVED)   -continue midodrine 10 mg every 8 hours  -TTE performed 6/2 showing normal LV function with EF 71% and LV hypertrophy, normal RV size and function, moderate LA dilation   - TTE: EF 67%, mod TR, R. pleural effusion   - The patient has been weaned off of vasopressors, monitor hemodynamics closely  -cardio following

## 2025-07-16 ENCOUNTER — APPOINTMENT (OUTPATIENT)
Dept: DERMATOLOGY | Facility: CLINIC | Age: 60
End: 2025-07-16
Payer: MEDICARE

## 2025-07-16 PROCEDURE — 99214 OFFICE O/P EST MOD 30 MIN: CPT

## 2025-07-16 RX ORDER — KETOCONAZOLE 20 MG/G
2 CREAM TOPICAL
Qty: 1 | Refills: 6 | Status: ACTIVE | COMMUNITY
Start: 2025-07-16 | End: 1900-01-01

## 2025-07-16 RX ORDER — SODIUM SULFACETAMIDE 100 MG/G
10 LIQUID TOPICAL
Qty: 1 | Refills: 3 | Status: ACTIVE | COMMUNITY
Start: 2025-07-16 | End: 1900-01-01